# Patient Record
Sex: MALE | Race: WHITE | NOT HISPANIC OR LATINO | Employment: OTHER | URBAN - METROPOLITAN AREA
[De-identification: names, ages, dates, MRNs, and addresses within clinical notes are randomized per-mention and may not be internally consistent; named-entity substitution may affect disease eponyms.]

---

## 2017-10-15 ENCOUNTER — APPOINTMENT (EMERGENCY)
Dept: RADIOLOGY | Facility: HOSPITAL | Age: 67
End: 2017-10-15
Payer: MEDICARE

## 2017-10-15 ENCOUNTER — HOSPITAL ENCOUNTER (OUTPATIENT)
Facility: HOSPITAL | Age: 67
Setting detail: OBSERVATION
Discharge: HOME | End: 2017-10-16
Attending: FAMILY MEDICINE | Admitting: FAMILY MEDICINE
Payer: MEDICARE

## 2017-10-15 DIAGNOSIS — R41.3 AMNESIA: ICD-10-CM

## 2017-10-15 DIAGNOSIS — R41.82 ALTERED MENTAL STATUS, UNSPECIFIED ALTERED MENTAL STATUS TYPE: ICD-10-CM

## 2017-10-15 DIAGNOSIS — R41.82 ALTERED MENTAL STATUS: Primary | ICD-10-CM

## 2017-10-15 PROBLEM — D72.829 LEUKOCYTOSIS: Status: ACTIVE | Noted: 2017-10-15

## 2017-10-15 LAB
ALBUMIN SERPL BCP-MCNC: 3.9 G/DL (ref 3.5–5)
ALP SERPL-CCNC: 94 U/L (ref 46–116)
ALT SERPL W P-5'-P-CCNC: 38 U/L (ref 12–78)
AMMONIA PLAS-SCNC: <10 UMOL/L (ref 11–35)
AMPHETAMINES SERPL QL SCN: NEGATIVE
ANION GAP SERPL CALCULATED.3IONS-SCNC: 7 MMOL/L (ref 4–13)
APTT PPP: 24 SECONDS (ref 24–33)
AST SERPL W P-5'-P-CCNC: 40 U/L (ref 5–45)
BACTERIA UR QL AUTO: ABNORMAL /HPF
BARBITURATES UR QL: NEGATIVE
BASOPHILS # BLD AUTO: 0 THOUSANDS/ΜL (ref 0–0.1)
BASOPHILS NFR BLD AUTO: 0 % (ref 0–1)
BENZODIAZ UR QL: NEGATIVE
BILIRUB SERPL-MCNC: 0.8 MG/DL (ref 0.2–1)
BILIRUB UR QL STRIP: NEGATIVE
BUN SERPL-MCNC: 16 MG/DL (ref 5–25)
CALCIUM SERPL-MCNC: 8.9 MG/DL (ref 8.3–10.1)
CHLORIDE SERPL-SCNC: 100 MMOL/L (ref 100–108)
CLARITY UR: CLEAR
CO2 SERPL-SCNC: 29 MMOL/L (ref 21–32)
COCAINE UR QL: NEGATIVE
COLOR UR: YELLOW
CREAT SERPL-MCNC: 0.99 MG/DL (ref 0.6–1.3)
EOSINOPHIL # BLD AUTO: 0 THOUSAND/ΜL (ref 0–0.61)
EOSINOPHIL NFR BLD AUTO: 0 % (ref 0–6)
ERYTHROCYTE [DISTWIDTH] IN BLOOD BY AUTOMATED COUNT: 13.8 % (ref 11.6–15.1)
ETHANOL SERPL-MCNC: <3 MG/DL (ref 0–3)
GFR SERPL CREATININE-BSD FRML MDRD: 79 ML/MIN/1.73SQ M
GLUCOSE SERPL-MCNC: 133 MG/DL (ref 65–140)
GLUCOSE SERPL-MCNC: 153 MG/DL (ref 65–140)
GLUCOSE UR STRIP-MCNC: NEGATIVE MG/DL
HCT VFR BLD AUTO: 41.5 % (ref 42–52)
HGB BLD-MCNC: 13.7 G/DL (ref 14–18)
HGB UR QL STRIP.AUTO: ABNORMAL
HOLD SPECIMEN: NORMAL
INR PPP: 0.99 (ref 0.86–1.16)
KETONES UR STRIP-MCNC: ABNORMAL MG/DL
LEUKOCYTE ESTERASE UR QL STRIP: NEGATIVE
LYMPHOCYTES # BLD AUTO: 0.3 THOUSANDS/ΜL (ref 0.6–4.47)
LYMPHOCYTES NFR BLD AUTO: 2 % (ref 14–44)
MCH RBC QN AUTO: 31.8 PG (ref 27–31)
MCHC RBC AUTO-ENTMCNC: 33 G/DL (ref 31.4–37.4)
MCV RBC AUTO: 96 FL (ref 82–98)
METHADONE UR QL: NEGATIVE
MONOCYTES # BLD AUTO: 0.3 THOUSAND/ΜL (ref 0.17–1.22)
MONOCYTES NFR BLD AUTO: 2 % (ref 4–12)
MUCOUS THREADS UR QL AUTO: ABNORMAL
NEUTROPHILS # BLD AUTO: 13.1 THOUSANDS/ΜL (ref 1.85–7.62)
NEUTS SEG NFR BLD AUTO: 96 % (ref 43–75)
NITRITE UR QL STRIP: NEGATIVE
NON-SQ EPI CELLS URNS QL MICRO: ABNORMAL /HPF
NRBC BLD AUTO-RTO: 0 /100 WBCS
OPIATES UR QL SCN: NEGATIVE
PCP UR QL: NEGATIVE
PH UR STRIP.AUTO: 6 [PH] (ref 5–9)
PLATELET # BLD AUTO: 249 THOUSANDS/UL (ref 130–400)
PLATELET BLD QL SMEAR: ADEQUATE
PMV BLD AUTO: 7.2 FL (ref 8.9–12.7)
POTASSIUM SERPL-SCNC: 3.9 MMOL/L (ref 3.5–5.3)
PROT SERPL-MCNC: 7.6 G/DL (ref 6.4–8.2)
PROT UR STRIP-MCNC: NEGATIVE MG/DL
PROTHROMBIN TIME: 10.4 SECONDS (ref 9.4–11.7)
RBC # BLD AUTO: 4.32 MILLION/UL (ref 4.7–6.1)
RBC #/AREA URNS AUTO: ABNORMAL /HPF
SODIUM SERPL-SCNC: 136 MMOL/L (ref 136–145)
SP GR UR STRIP.AUTO: 1.01 (ref 1–1.03)
THC UR QL: NEGATIVE
TROPONIN I SERPL-MCNC: <0.02 NG/ML
TROPONIN I SERPL-MCNC: <0.02 NG/ML
UROBILINOGEN UR QL STRIP.AUTO: 0.2 E.U./DL
WBC # BLD AUTO: 13.7 THOUSAND/UL (ref 4.8–10.8)
WBC #/AREA URNS AUTO: ABNORMAL /HPF

## 2017-10-15 PROCEDURE — 85610 PROTHROMBIN TIME: CPT | Performed by: PHYSICIAN ASSISTANT

## 2017-10-15 PROCEDURE — 82948 REAGENT STRIP/BLOOD GLUCOSE: CPT

## 2017-10-15 PROCEDURE — 85730 THROMBOPLASTIN TIME PARTIAL: CPT | Performed by: PHYSICIAN ASSISTANT

## 2017-10-15 PROCEDURE — 84484 ASSAY OF TROPONIN QUANT: CPT | Performed by: PHYSICIAN ASSISTANT

## 2017-10-15 PROCEDURE — 80053 COMPREHEN METABOLIC PANEL: CPT | Performed by: PHYSICIAN ASSISTANT

## 2017-10-15 PROCEDURE — 93005 ELECTROCARDIOGRAM TRACING: CPT | Performed by: PHYSICIAN ASSISTANT

## 2017-10-15 PROCEDURE — 80307 DRUG TEST PRSMV CHEM ANLYZR: CPT | Performed by: PHYSICIAN ASSISTANT

## 2017-10-15 PROCEDURE — 70450 CT HEAD/BRAIN W/O DYE: CPT

## 2017-10-15 PROCEDURE — 81001 URINALYSIS AUTO W/SCOPE: CPT | Performed by: PHYSICIAN ASSISTANT

## 2017-10-15 PROCEDURE — 87081 CULTURE SCREEN ONLY: CPT | Performed by: FAMILY MEDICINE

## 2017-10-15 PROCEDURE — 99285 EMERGENCY DEPT VISIT HI MDM: CPT

## 2017-10-15 PROCEDURE — 71020 HB CHEST X-RAY 2VW FRONTAL&LATL: CPT

## 2017-10-15 PROCEDURE — 82140 ASSAY OF AMMONIA: CPT | Performed by: PHYSICIAN ASSISTANT

## 2017-10-15 PROCEDURE — 85025 COMPLETE CBC W/AUTO DIFF WBC: CPT | Performed by: PHYSICIAN ASSISTANT

## 2017-10-15 PROCEDURE — 36415 COLL VENOUS BLD VENIPUNCTURE: CPT | Performed by: PHYSICIAN ASSISTANT

## 2017-10-15 PROCEDURE — 80320 DRUG SCREEN QUANTALCOHOLS: CPT | Performed by: PHYSICIAN ASSISTANT

## 2017-10-15 PROCEDURE — 84484 ASSAY OF TROPONIN QUANT: CPT | Performed by: FAMILY MEDICINE

## 2017-10-15 RX ORDER — ONDANSETRON 2 MG/ML
4 INJECTION INTRAMUSCULAR; INTRAVENOUS EVERY 6 HOURS PRN
Status: DISCONTINUED | OUTPATIENT
Start: 2017-10-15 | End: 2017-10-16 | Stop reason: HOSPADM

## 2017-10-15 RX ORDER — ATORVASTATIN CALCIUM 40 MG/1
40 TABLET, FILM COATED ORAL
Status: DISCONTINUED | OUTPATIENT
Start: 2017-10-15 | End: 2017-10-16 | Stop reason: HOSPADM

## 2017-10-15 RX ORDER — ACETAMINOPHEN 325 MG/1
650 TABLET ORAL EVERY 4 HOURS PRN
Status: DISCONTINUED | OUTPATIENT
Start: 2017-10-15 | End: 2017-10-16 | Stop reason: HOSPADM

## 2017-10-15 RX ORDER — ASPIRIN 325 MG
325 TABLET ORAL DAILY
Status: DISCONTINUED | OUTPATIENT
Start: 2017-10-15 | End: 2017-10-16 | Stop reason: HOSPADM

## 2017-10-15 RX ORDER — SODIUM CHLORIDE 9 MG/ML
75 INJECTION, SOLUTION INTRAVENOUS CONTINUOUS
Status: DISCONTINUED | OUTPATIENT
Start: 2017-10-15 | End: 2017-10-16 | Stop reason: HOSPADM

## 2017-10-15 RX ADMIN — SODIUM CHLORIDE 1000 ML: 0.9 INJECTION, SOLUTION INTRAVENOUS at 17:16

## 2017-10-15 RX ADMIN — SODIUM CHLORIDE 75 ML/HR: 0.9 INJECTION, SOLUTION INTRAVENOUS at 19:51

## 2017-10-15 RX ADMIN — ACETAMINOPHEN 650 MG: 325 TABLET, FILM COATED ORAL at 19:51

## 2017-10-15 RX ADMIN — ATORVASTATIN CALCIUM 40 MG: 40 TABLET, FILM COATED ORAL at 22:28

## 2017-10-15 RX ADMIN — ASPIRIN 325 MG: 325 TABLET ORAL at 19:51

## 2017-10-15 NOTE — ED PROVIDER NOTES
History  Chief Complaint   Patient presents with    Altered Mental Status     patient brought to ER by sister for altered mental status - last seen normal yesterday  patient c/o headache  History provided by:  Patient   used: No    Altered Mental Status   Presenting symptoms: behavior changes, confusion, disorientation and memory loss    Presenting symptoms: no combativeness, no lethargy, no partial responsiveness and no unresponsiveness    Severity:  Severe  Most recent episode:  Yesterday  Episode history:  Continuous  Duration:  1 day  Timing:  Constant  Progression:  Unchanged  Chronicity:  New  Context: not alcohol use, not dementia, not drug use, not head injury, not homeless, taking medications as prescribed, not nursing home resident, not recent change in medication, not recent illness and not recent infection    Associated symptoms: headaches and seizures (Probable seizure)    Associated symptoms: no abdominal pain, normal movement, no agitation, no bladder incontinence, no decreased appetite, no depression, no difficulty breathing, no eye deviation, no fever, no hallucinations, no light-headedness, no nausea, no palpitations, no rash, no slurred speech, no suicidal behavior, no visual change, no vomiting and no weakness        None       History reviewed  No pertinent past medical history  Past Surgical History:   Procedure Laterality Date    CHOLECYSTECTOMY      FRACTURE SURGERY      Left femur fx       History reviewed  No pertinent family history  I have reviewed and agree with the history as documented  Social History   Substance Use Topics    Smoking status: Never Smoker    Smokeless tobacco: Never Used    Alcohol use No        Review of Systems   Constitutional: Negative for chills, decreased appetite, diaphoresis, fatigue and fever  HENT: Negative  Eyes: Negative for photophobia and visual disturbance     Respiratory: Negative for cough, chest tightness, shortness of breath and wheezing  Cardiovascular: Negative for palpitations  Gastrointestinal: Negative for abdominal pain, nausea and vomiting  Genitourinary: Negative  Negative for bladder incontinence  Musculoskeletal: Positive for back pain (Right side)  Negative for myalgias and neck stiffness  Skin: Negative for color change, pallor and rash  Neurological: Positive for seizures (Probable seizure) and headaches  Negative for dizziness, syncope, facial asymmetry, weakness, light-headedness and numbness  Psychiatric/Behavioral: Positive for confusion and memory loss  Negative for agitation and hallucinations  Physical Exam  ED Triage Vitals   Temperature Pulse Respirations Blood Pressure SpO2   10/15/17 1533 10/15/17 1533 10/15/17 1533 10/15/17 1533 10/15/17 1533   98 4 °F (36 9 °C) 88 18 139/62 94 %      Temp Source Heart Rate Source Patient Position - Orthostatic VS BP Location FiO2 (%)   10/15/17 1533 10/15/17 1533 10/15/17 1533 10/15/17 1910 --   Oral Monitor Lying Right arm       Pain Score       10/15/17 1533       5           Physical Exam   Constitutional: He appears well-developed and well-nourished  No distress  HENT:   Head: Normocephalic and atraumatic  Right Ear: External ear normal    Left Ear: External ear normal    Nose: Nose normal    Small tongue abrasions on bilateral sides   Eyes: Pupils are equal, round, and reactive to light  Horizontal nystagmus on exam   Neck: Normal range of motion  Neck supple  No tracheal deviation present  Cardiovascular: Normal rate, regular rhythm, normal heart sounds and intact distal pulses  Exam reveals no friction rub  No murmur heard  Pulmonary/Chest: Effort normal and breath sounds normal  No respiratory distress  He has no wheezes  Abdominal: Soft  There is no tenderness  Musculoskeletal:        Back:    Lymphadenopathy:     He has no cervical adenopathy  Neurological: He is alert  He has normal strength   He is disoriented (Oriented to person and place only)  He displays no tremor  No cranial nerve deficit or sensory deficit  He exhibits normal muscle tone  Coordination normal  GCS eye subscore is 4  GCS verbal subscore is 5  GCS motor subscore is 6  No pronator drift  Strength +5 throughout  Skin: Skin is warm and dry  Capillary refill takes less than 2 seconds  No rash noted  He is not diaphoretic  No pallor  Nursing note and vitals reviewed  ED Medications  Medications   sodium chloride 0 9 % infusion (75 mL/hr Intravenous New Bag 10/1950)   acetaminophen (TYLENOL) tablet 650 mg (650 mg Oral Given 10/1950)   ondansetron (ZOFRAN) injection 4 mg (not administered)   atorvastatin (LIPITOR) tablet 40 mg (40 mg Oral Given 10/15/17 2228)   aspirin tablet 325 mg (325 mg Oral Given 10/1950)   enoxaparin (LOVENOX) subcutaneous injection 40 mg (not administered)   sodium chloride 0 9 % bolus 1,000 mL (1,000 mL Intravenous New Bag 10/15/17 1716)       Diagnostic Studies  Labs Reviewed   COMPREHENSIVE METABOLIC PANEL - Abnormal        Result Value Ref Range Status    Glucose 153 (*) 65 - 140 mg/dL Final    Comment:   If the patient is fasting, the ADA then defines impaired fasting glucose as > 100 mg/dL and diabetes as > or equal to 123 mg/dL  Specimen collection should occur prior to Sulfasalazine administration due to the potential for falsely depressed results  Specimen collection should occur prior to Sulfapyridine administration due to the potential for falsely elevated results      Sodium 136  136 - 145 mmol/L Final    Potassium 3 9  3 5 - 5 3 mmol/L Final    Chloride 100  100 - 108 mmol/L Final    CO2 29  21 - 32 mmol/L Final    Anion Gap 7  4 - 13 mmol/L Final    BUN 16  5 - 25 mg/dL Final    Creatinine 0 99  0 60 - 1 30 mg/dL Final    Comment: Standardized to IDMS reference method    Calcium 8 9  8 3 - 10 1 mg/dL Final    AST 40  5 - 45 U/L Final    Comment:   Specimen collection should occur prior to Sulfasalazine administration due to the potential for falsely depressed results  ALT 38  12 - 78 U/L Final    Comment:   Specimen collection should occur prior to Sulfasalazine administration due to the potential for falsely depressed results  Alkaline Phosphatase 94  46 - 116 U/L Final    Total Protein 7 6  6 4 - 8 2 g/dL Final    Albumin 3 9  3 5 - 5 0 g/dL Final    Total Bilirubin 0 80  0 20 - 1 00 mg/dL Final    eGFR 79  ml/min/1 73sq m Final    Narrative:     National Kidney Disease Education Program recommendations are as follows:  GFR calculation is accurate only with a steady state creatinine  Chronic Kidney disease less than 60 ml/min/1 73 sq  meters  Kidney failure less than 15 ml/min/1 73 sq  meters     CBC AND DIFFERENTIAL - Abnormal     WBC 13 70 (*) 4 80 - 10 80 Thousand/uL Final    RBC 4 32 (*) 4 70 - 6 10 Million/uL Final    Hemoglobin 13 7 (*) 14 0 - 18 0 g/dL Final    Hematocrit 41 5 (*) 42 0 - 52 0 % Final    MCH 31 8 (*) 27 0 - 31 0 pg Final    MPV 7 2 (*) 8 9 - 12 7 fL Final    Neutrophils Relative 96 (*) 43 - 75 % Final    Lymphocytes Relative 2 (*) 14 - 44 % Final    Monocytes Relative 2 (*) 4 - 12 % Final    Neutrophils Absolute 13 10 (*) 1 85 - 7 62 Thousands/µL Final    Lymphocytes Absolute 0 30 (*) 0 60 - 4 47 Thousands/µL Final    MCV 96  82 - 98 fL Final    MCHC 33 0  31 4 - 37 4 g/dL Final    RDW 13 8  11 6 - 15 1 % Final    Platelets 222  970 - 400 Thousands/uL Final    nRBC 0  /100 WBCs Final    Eosinophils Relative 0  0 - 6 % Final    Basophils Relative 0  0 - 1 % Final    Monocytes Absolute 0 30  0 17 - 1 22 Thousand/µL Final    Eosinophils Absolute 0 00  0 00 - 0 61 Thousand/µL Final    Basophils Absolute 0 00  0 00 - 0 10 Thousands/µL Final   UA W REFLEX TO MICROSCOPIC WITH REFLEX TO CULTURE - Abnormal     Ketones, UA Trace (*) Negative mg/dl Final    Blood, UA Trace-lysed (*) Negative Final    Color, UA Yellow   Final    Clarity, UA Clear   Final    Specific [place], UA 1 015  1 000 - 1 030 Final    pH, UA 6 0  5 0 - 9 0 Final    Leukocytes, UA Negative  Negative Final    Nitrite, UA Negative  Negative Final    Protein, UA Negative  Negative mg/dl Final    Glucose, UA Negative  Negative mg/dl Final    Urobilinogen, UA 0 2  0 2, 1 0 E U /dl E U /dl Final    Bilirubin, UA Negative  Negative Final   AMMONIA - Abnormal     Ammonia <10 (*) 11 - 35 umol/L Final    Comment: 3  Specimen collection should occur prior to Sulfapyridine administration due to the potential for falsely depressed results  URINE MICROSCOPIC - Abnormal     RBC, UA 2-4 (*) None Seen, 0-5 /hpf Final    WBC, UA 2-4 (*) None Seen, 0-5, 5-55, 5-65 /hpf Final    Epithelial Cells Occasional  None Seen, Occasional /hpf Final    Bacteria, UA Occasional  None Seen, Occasional /hpf Final    MUCOUS THREADS Moderate  Occasional, Moderate, Innumerable Final   TROPONIN I - Normal    Troponin I <0 02  <=0 04 ng/mL Final    Comment: 3Autovalidation override    Narrative:     [employer] Chemistry analyzer 99% cutoff is > 0 04 ng/mL in network labs    o cTnI 99% cutoff is useful only when applied to patients in the clinical setting of myocardial ischemia  o cTnI 99% cutoff should be interpreted in the context of clinical history, ECG findings and possibly cardiac imaging to establish correct diagnosis  o cTnI 99% cutoff may be suggestive but clearly not indicative of a coronary event without the clinical setting of myocardial ischemia  PROTIME-INR - Normal    Protime 10 4  9 4 - 11 7 seconds Final    INR 0 99  0 86 - 1 16 Final   APTT - Normal    PTT 24  24 - 33 seconds Final    Narrative:      Therapeutic Heparin Range = 60-90 seconds   MEDICAL ALCOHOL - Normal    Ethanol Lvl <3  0 - 3 mg/dL Final   RAPID DRUG SCREEN, URINE - Normal    Amph/Meth UR Negative  Negative Final    Barbiturate Ur Negative  Negative Final    Benzodiazepine Urine Negative  Negative Final    Cocaine Urine Negative  Negative Final    Methadone Urine Negative  Negative Final    Opiate Urine Negative  Negative Final    PCP Ur Negative  Negative Final    THC Urine Negative  Negative Final    Narrative:     FOR MEDICAL PURPOSES ONLY  IF CONFIRMATION NEEDED PLEASE CONTACT THE LAB WITHIN 5 DAYS  Drug Screen Cutoff Levels:  AMPHETAMINE/METHAMPHETAMINES  1000 ng/mL  BARBITURATES     200 ng/mL  BENZODIAZEPINES     200 ng/mL  COCAINE      300 ng/mL  METHADONE      300 ng/mL  OPIATES      300 ng/mL  PHENCYCLIDINE     25 ng/mL  THC       50 ng/mL   POCT GLUCOSE - Normal    POC Glucose 133  65 - 140 mg/dl Final    Comment: RN NotifiedThe result was performed at [facility] [address]: [address], [address] REVIEW(PHLEBS DO NOT ORDER) - Normal    Platelet Estimate Adequate  Adequate Final   LAVENDER TOP 3 ML ON HOLD    Extra Tube Hold for add-ons  Final    Comment: Auto resulted  RED TOP       CT head without contrast   Final Result      No acute intracranial abnormality           Workstation performed: [id number]         XR chest 2 views    (Results Pending)   CTA head and neck w wo contrast    (Results Pending)   MRI Inpatient Order    (Results Pending)       Procedures  ECG 12 Lead Documentation  Date/Time: 10/15/2017 4:30 PM  Performed by: Jo Ann Moulton by: Robinson Amaya     Indications / Diagnosis:  Altered mental status  ECG reviewed by me, the ED Provider: yes    Patient location:  ED  Interpretation:     Interpretation: normal    Rate:     ECG rate:  85 bpm    ECG rate assessment: normal    Rhythm:     Rhythm: sinus rhythm    Ectopy:     Ectopy: none    QRS:     QRS axis:  Normal    QRS intervals:  Normal  Conduction:     Conduction: normal    ST segments:     ST segments:  Normal  T waves:     T waves: normal      CriticalCare Time  Performed by: Robinson Amaya  Authorized by: BERYL Alvarenga     Critical care provider statement:     Critical care time (minutes):  45    Critical care was necessary to treat or prevent imminent or life-threatening deterioration of the following conditions:  CNS failure or compromise    Critical care was time spent personally by me on the following activities:  Blood draw for specimens, obtaining history from patient or surrogate, development of treatment plan with patient or surrogate, evaluation of patient's response to treatment, examination of patient, ordering and performing treatments and interventions, ordering and review of laboratory studies, ordering and review of radiographic studies, re-evaluation of patient's condition and review of old charts    I assumed direction of critical care for this patient from another provider in my specialty: no            Phone Contacts  ED Phone Contact    ED Course  ED Course              NIH Stroke Scale    Flowsheet Row Most Recent Value   Level of Consciousness (1a )  0 Filed at: 10/15/2017 2000   LOC Questions (1b )  1 Filed at: 10/15/2017 1613   LOC Commands (1c )  0 Filed at: 10/15/2017 1613   Best Gaze (2 )  0 Filed at: 10/15/2017 1613   Visual (3 )  0 Filed at: 10/15/2017 1613   Facial Palsy (4 )  0 Filed at: 10/15/2017 1613   Motor Arm, Left (5a )  0 Filed at: 10/15/2017 1613   Motor Arm, Right (5b )  0 Filed at: 10/15/2017 1613   Motor Leg, Left (6a )  0 Filed at: 10/15/2017 1613   Motor Leg, Right (6b )  0 Filed at: 10/15/2017 1613   Limb Ataxia (7 )  0 Filed at: 10/15/2017 1613   Sensory (8 )  0 Filed at: 10/15/2017 1613   Best Language (9 )  0 Filed at: 10/15/2017 1613   Dysarthria (10 )  0 Filed at: 10/15/2017 1613   Extinction and Inattention (11 ) (Formerly Neglect)  0 Filed at: 10/15/2017 1613   Total  1 Filed at: 10/15/2017 1613                        MDM  Number of Diagnoses or Management Options  Altered mental status: new and requires workup  Diagnosis management comments: The patient's lab results were unremarkable, and CT head was negative for acute intracranial abnormality  Normal EKG and neg troponin    The patient's case was discussed with Dr Stew Esquivel who accepted the patient to her service for further inpatient evaluation, treatment, and specialist consult as necessary  Amount and/or Complexity of Data Reviewed  Clinical lab tests: ordered and reviewed  Tests in the radiology section of CPT®: ordered and reviewed  Obtain history from someone other than the patient: yes  Review and summarize past medical records: yes  Discuss the patient with other providers: yes (Dr Benny Ayala)      The patient presented with a condition in which there was a high probability of imminent or life-threatening deterioration, and critical care services (excluding separately billable procedures) totalled 30-74 minutes  Disposition  Final diagnoses: Altered mental status     ED Disposition     ED Disposition Condition Comment    Admit  Case was discussed with Dr Stew Esquivel and the patient's admission status was agreed to be Admission Status: observation status to the service of Dr Stew Esquivel  Follow-up Information    None       There are no discharge medications for this patient  No discharge procedures on file      ED Provider  Electronically Signed by  The     Mita Aquino PA-C  10/16/17 3934

## 2017-10-15 NOTE — H&P
History and Physical - [facility] Medicine    Patient Information: Gilda Guzman 78 y o  male MRN: [id number]  Unit/Bed#: ED CT1 Encounter: [id number]  Admitting Physician: Milton Jimenez DO  PCP: No primary care provider on file  Date of Admission:  10/15/17        Hospital Problem List:     Principal Problem:    Amnesia  Active Problems:    Leukocytosis      Assessment/Plan:    1  Amnesia - differential includes seizure, CVA, transient global amnesia  Patient with tongue abrasions and urinary incontinence  Will obtain EEG to rule out seizures  Consult Neurology for further recommendations  CT head showed no acute CVA  Check MRI of the brain, CTA head/neck, echo with bubble study for further evaluation  PT/OT while here  Patient started on aspirin, Lipitor  Check lipid panel, hemoglobin A1c level in the a m  Check neurochecks  Urine tox was negative  Alcohol level less than 3  Ammonia level less than 10    2  Leukocytosis - likely reactive in nature  No signs of infection  U/A not convincing for UTI  CXR with no obvious pneumonia although official reading is pending  Repeat level in the AM      VTE Prophylaxis: Enoxaparin (Lovenox)  / sequential compression device   Code Status: full code    Anticipated Length of Stay:  Patient will be admitted on an Observation basis with an anticipated length of stay of 1 midnights  Total Time for Visit, including Counseling / Coordination of Care: 45 minutes  Greater than 50% of this total time spent on direct patient counseling and coordination of care  Chief Complaint:     Altered Mental Status (patient brought to ER by sister for altered mental status - last seen normal yesterday  patient c/o headache   )      History of Present Illness:    Gilda Guzman is a 78 y o  male who presents with amnesia  Last known normal as per sister was yesterday morning by her other brother    Her other brother went to see the patient in the early afternoon today and found patient to be very forgetful and then went to his sister who brought the patient into the ER   patient also reported that he had wet his bed today  no bowel incontinence  Patient bought a  yesterday but does not remember this  He denies any visual changes, weakness, fevers, chills  As per sister there was no slurred speech or facial droop noted when she had seen her brother  patient went to his sister's house on Thursday which he somewhat remembers but he is not sure why he went or what they talked about  Patient denies any prior episode of this  He denies any new medications or use of illicit drugs or prior history of seizures  Patient has not seen a doctor in [address]  When asked if he remembered what he did over this past week, patient stated that he could not remember much at all  He reports a headache right now but unsure when it started    Review of Systems:    Review of Systems   Constitutional: Negative for chills and fever  HENT: Negative for congestion, sore throat and trouble swallowing  Eyes: Negative for photophobia and visual disturbance  Respiratory: Negative for chest tightness and shortness of breath  Cardiovascular: Negative for chest pain and leg swelling  Gastrointestinal: Negative for abdominal pain, diarrhea, nausea and vomiting  Genitourinary: Negative for dysuria, frequency and hematuria  Musculoskeletal: Negative for neck stiffness  Skin: Negative for wound  Neurological: Positive for headaches  Negative for dizziness, syncope, speech difficulty, weakness and light-headedness  Hematological: Does not bruise/bleed easily  Psychiatric/Behavioral: Positive for confusion  Negative for agitation  Past Medical and Surgical History:     History reviewed  No pertinent past medical history      Past Surgical History:   Procedure Laterality Date    CHOLECYSTECTOMY         Meds/Allergies:    PTA meds:   None       Allergies: No Known Allergies    Social History:     Marital Status: Single     Substance Use History:   History   Alcohol Use No     History   Smoking Status    Never Smoker   Smokeless Tobacco    Never Used     History   Drug Use No       Family History:    History reviewed  No pertinent family history  Physical Exam:     Vitals:   Blood Pressure: 122/64 (10/15/17 1645)  Pulse: 84 (10/15/17 1800)  Temperature: 98 4 °F (36 9 °C) (10/15/17 1533)  Temp Source: Oral (10/15/17 1533)  Respirations: 21 (10/15/17 1800)  Height: 6' (182 9 cm) (10/15/17 1533)  SpO2: 97 % (10/15/17 1800)    Physical Exam   Constitutional: He appears well-developed and well-nourished  No distress  HENT:   Head: Normocephalic and atraumatic  B/L tongue abrasions noted   Eyes: EOM are normal  Right eye exhibits no discharge  Left eye exhibits no discharge  No scleral icterus  Neck: Neck supple  No tracheal deviation present  Cardiovascular: Normal rate and regular rhythm  Pulmonary/Chest: Effort normal and breath sounds normal  No respiratory distress  He has no wheezes  He has no rales  Abdominal: Soft  Bowel sounds are normal  He exhibits no distension  There is no tenderness  Musculoskeletal: He exhibits no edema  Neurological: He is alert  No cranial nerve deficit  He exhibits normal muscle tone  Oriented to self and place only  Knew his birthday  Patient was informed what the month and year is and upon questioning again was able to recall the year only  No pronator drift noted   Skin: Skin is dry  He is not diaphoretic  Psychiatric: He has a normal mood and affect  Lab Results: I have personally reviewed pertinent reports          Results from last 7 days  Lab Units 10/15/17  1545   WBC Thousand/uL 13 70*   HEMOGLOBIN g/dL 13 7*   HEMATOCRIT % 41 5*   PLATELETS Thousands/uL 249   NEUTROS PCT % 96*   LYMPHS PCT % 2*   MONOS PCT % 2*   EOS PCT % 0       Results from last 7 days  Lab Units 10/15/17  1545   SODIUM mmol/L 136 POTASSIUM mmol/L 3 9   CHLORIDE mmol/L 100   CO2 mmol/L 29   BUN mg/dL 16   CREATININE mg/dL 0 99   CALCIUM mg/dL 8 9   TOTAL PROTEIN g/dL 7 6   BILIRUBIN TOTAL mg/dL 0 80   ALK PHOS U/L 94   ALT U/L 38   AST U/L 40   GLUCOSE RANDOM mg/dL 153*       Results from last 7 days  Lab Units 10/15/17  1545   INR  0 99       Imaging: I have personally reviewed pertinent reports  Ct Head Without Contrast    Result Date: 10/15/2017  Narrative: CT BRAIN - WITHOUT CONTRAST INDICATION:  Altered mental status COMPARISON:  None  TECHNIQUE:  CT examination of the brain was performed  In addition to axial images, coronal reformatted images were created and submitted for interpretation  Radiation dose length product (DLP) for this visit:  1185 38 mGy-cm   This examination, like all CT scans performed in the [facility], was performed utilizing techniques to minimize radiation dose exposure, including the use of iterative reconstruction and automated exposure control  IMAGE QUALITY:  Diagnostic  FINDINGS:  PARENCHYMA:  No intracranial mass, mass effect or midline shift  No CT signs of acute infarction  There is no parenchymal hemorrhage  VENTRICLES AND EXTRA-AXIAL SPACES:  Normal for patient's age  VISUALIZED ORBITS AND PARANASAL SINUSES:  Orbits appear normal   Mild scattered sinus mucosal thickening is noted  No fluid levels are seen  CALVARIUM AND EXTRACRANIAL SOFT TISSUES:   Normal      Impression: No acute intracranial abnormality  Workstation performed: [id number]       CT head without contrast   Final Result      No acute intracranial abnormality           Workstation performed: [id number]         XR chest 2 views    (Results Pending)   CTA head and neck w wo contrast    (Results Pending)       EKG, Pathology, and Other Studies Reviewed on Admission:   · EKG shows sinus rhythm with no ST elevations    [employer] Records Reviewed: N/A    ** Please Note: Dragon 360 Dictation voice to text software may have been used in the creation of this document   **

## 2017-10-16 ENCOUNTER — APPOINTMENT (OUTPATIENT)
Dept: NEUROLOGY | Facility: HOSPITAL | Age: 67
End: 2017-10-16
Attending: PSYCHIATRY & NEUROLOGY
Payer: MEDICARE

## 2017-10-16 ENCOUNTER — APPOINTMENT (OUTPATIENT)
Dept: RADIOLOGY | Facility: HOSPITAL | Age: 67
End: 2017-10-16
Payer: MEDICARE

## 2017-10-16 ENCOUNTER — APPOINTMENT (OUTPATIENT)
Dept: NON INVASIVE DIAGNOSTICS | Facility: HOSPITAL | Age: 67
End: 2017-10-16
Payer: MEDICARE

## 2017-10-16 VITALS
OXYGEN SATURATION: 95 % | RESPIRATION RATE: 18 BRPM | DIASTOLIC BLOOD PRESSURE: 65 MMHG | TEMPERATURE: 99.1 F | SYSTOLIC BLOOD PRESSURE: 139 MMHG | HEIGHT: 72 IN | BODY MASS INDEX: 24.79 KG/M2 | WEIGHT: 183 LBS | HEART RATE: 80 BPM

## 2017-10-16 PROBLEM — D72.829 LEUKOCYTOSIS: Status: RESOLVED | Noted: 2017-10-15 | Resolved: 2017-10-16

## 2017-10-16 PROBLEM — R41.3 AMNESIA: Status: RESOLVED | Noted: 2017-10-15 | Resolved: 2017-10-16

## 2017-10-16 LAB
ANION GAP SERPL CALCULATED.3IONS-SCNC: 7 MMOL/L (ref 4–13)
BUN SERPL-MCNC: 14 MG/DL (ref 5–25)
CALCIUM SERPL-MCNC: 8.5 MG/DL (ref 8.3–10.1)
CHLORIDE SERPL-SCNC: 106 MMOL/L (ref 100–108)
CHOLEST SERPL-MCNC: 111 MG/DL (ref 50–200)
CO2 SERPL-SCNC: 28 MMOL/L (ref 21–32)
CREAT SERPL-MCNC: 0.83 MG/DL (ref 0.6–1.3)
ERYTHROCYTE [DISTWIDTH] IN BLOOD BY AUTOMATED COUNT: 14 % (ref 11.6–15.1)
EST. AVERAGE GLUCOSE BLD GHB EST-MCNC: 111 MG/DL
GFR SERPL CREATININE-BSD FRML MDRD: 92 ML/MIN/1.73SQ M
GLUCOSE SERPL-MCNC: 88 MG/DL (ref 65–140)
HBA1C MFR BLD: 5.5 % (ref 4.2–6.3)
HCT VFR BLD AUTO: 36.4 % (ref 42–52)
HDLC SERPL-MCNC: 49 MG/DL (ref 40–60)
HGB BLD-MCNC: 11.9 G/DL (ref 14–18)
LDLC SERPL CALC-MCNC: 55 MG/DL (ref 0–100)
MAGNESIUM SERPL-MCNC: 2.2 MG/DL (ref 1.6–2.6)
MCH RBC QN AUTO: 31.5 PG (ref 27–31)
MCHC RBC AUTO-ENTMCNC: 32.8 G/DL (ref 31.4–37.4)
MCV RBC AUTO: 96 FL (ref 82–98)
PHOSPHATE SERPL-MCNC: 2.7 MG/DL (ref 2.3–4.1)
PLATELET # BLD AUTO: 209 THOUSANDS/UL (ref 130–400)
PMV BLD AUTO: 7.3 FL (ref 8.9–12.7)
POTASSIUM SERPL-SCNC: 3.7 MMOL/L (ref 3.5–5.3)
RBC # BLD AUTO: 3.79 MILLION/UL (ref 4.7–6.1)
SODIUM SERPL-SCNC: 141 MMOL/L (ref 136–145)
TRIGL SERPL-MCNC: 35 MG/DL
WBC # BLD AUTO: 8.1 THOUSAND/UL (ref 4.8–10.8)

## 2017-10-16 PROCEDURE — 70498 CT ANGIOGRAPHY NECK: CPT

## 2017-10-16 PROCEDURE — G8989 SELF CARE D/C STATUS: HCPCS

## 2017-10-16 PROCEDURE — 80048 BASIC METABOLIC PNL TOTAL CA: CPT | Performed by: FAMILY MEDICINE

## 2017-10-16 PROCEDURE — 95816 EEG AWAKE AND DROWSY: CPT

## 2017-10-16 PROCEDURE — 93306 TTE W/DOPPLER COMPLETE: CPT

## 2017-10-16 PROCEDURE — 97162 PT EVAL MOD COMPLEX 30 MIN: CPT

## 2017-10-16 PROCEDURE — 92523 SPEECH SOUND LANG COMPREHEN: CPT

## 2017-10-16 PROCEDURE — G9169 MEMORY GOAL STATUS: HCPCS

## 2017-10-16 PROCEDURE — 97166 OT EVAL MOD COMPLEX 45 MIN: CPT

## 2017-10-16 PROCEDURE — 70551 MRI BRAIN STEM W/O DYE: CPT

## 2017-10-16 PROCEDURE — G8987 SELF CARE CURRENT STATUS: HCPCS

## 2017-10-16 PROCEDURE — 85027 COMPLETE CBC AUTOMATED: CPT | Performed by: FAMILY MEDICINE

## 2017-10-16 PROCEDURE — 70496 CT ANGIOGRAPHY HEAD: CPT

## 2017-10-16 PROCEDURE — G8978 MOBILITY CURRENT STATUS: HCPCS

## 2017-10-16 PROCEDURE — G9168 MEMORY CURRENT STATUS: HCPCS

## 2017-10-16 PROCEDURE — G8979 MOBILITY GOAL STATUS: HCPCS

## 2017-10-16 PROCEDURE — 84100 ASSAY OF PHOSPHORUS: CPT | Performed by: FAMILY MEDICINE

## 2017-10-16 PROCEDURE — G8988 SELF CARE GOAL STATUS: HCPCS

## 2017-10-16 PROCEDURE — 83735 ASSAY OF MAGNESIUM: CPT | Performed by: FAMILY MEDICINE

## 2017-10-16 PROCEDURE — 80061 LIPID PANEL: CPT | Performed by: FAMILY MEDICINE

## 2017-10-16 PROCEDURE — 83036 HEMOGLOBIN GLYCOSYLATED A1C: CPT | Performed by: FAMILY MEDICINE

## 2017-10-16 RX ORDER — LEVETIRACETAM 500 MG/1
500 TABLET ORAL EVERY 12 HOURS SCHEDULED
Qty: 60 TABLET | Refills: 0 | Status: SHIPPED | OUTPATIENT
Start: 2017-10-16 | End: 2018-03-08

## 2017-10-16 RX ADMIN — ENOXAPARIN SODIUM 40 MG: 40 INJECTION SUBCUTANEOUS at 09:40

## 2017-10-16 RX ADMIN — IOHEXOL 85 ML: 350 INJECTION, SOLUTION INTRAVENOUS at 01:29

## 2017-10-16 RX ADMIN — ASPIRIN 325 MG: 325 TABLET ORAL at 09:40

## 2017-10-16 NOTE — PLAN OF CARE
Problem: SLP ADULT - THOUGHT PROCESS, DISTURBED  Goal: Initial thought process eval performed  Outcome: Completed Date Met: 10/16/17

## 2017-10-16 NOTE — SOCIAL WORK
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

## 2017-10-16 NOTE — NUTRITION
10/16/17 1247   Assessment   Timepoint Initial  (Speech, Consult: ischemic stroke)   Labs   List Completed Labs (labs and meds reviewed)   Feeding Route   PO With assist  (Needs assist to order meals per ST)   Swallow (Per ST during discussion- pt needs cueing to answer some questions, order meals)   Adequacy of Intake   Nutrition Modality PO  (Cardiac Step 1)   Estimated calorie intake compared to estimated need Observed pt at breakfast meal, bites of oatmeal consumed, untouched entree at time of visit  Will monitor PO intakes     Nutrition Prognosis   Potential Fair   Nutrition Concerns (amnesia)   Comorbid Concerns CVA  (seizure)   Nutrition Precautions None   Nutrition Considerations (Education inappropriate d/t cognitive status)   PES Statement   Problem No nutrition diagnosis  (at this time)   Patient Nutrition Goals   Goal weight maintained;meet PO needs   Goal Status initiated   Timeframe to complete goal by next f/u   Recommendations/Interventions   Summary Will adjust room service for assistance to be provided with meal ordering   Interventions Diet: continued as ordered;Obtain current weight   Nutrition Recommendations Continue diet as ordered   Nutrition Complexity Risk   Nutrition complexity level Moderate risk   Nutrition review: 10/19/17  (po intakes)   Follow up date 10/23/17

## 2017-10-16 NOTE — CASE MANAGEMENT
Initial Clinical Review    Admission: Date/Time/Statement: N/A @ N/A     Orders Placed This Encounter   Procedures    Place in Observation     Standing Status:   Standing     Number of Occurrences:   1     Order Specific Question:   Admitting Physician     Answer:   Ariel Nichloson [[id number]]     Order Specific Question:   Level of Care     Answer:   Med Surg [16]         ED: Date/Time/Mode of Arrival:   ED Arrival Information     Expected Arrival Acuity Means of Arrival Escorted By Service Admission Type    - 10/15/2017 15:26 Emergent Walk-In Family Member General Medicine Emergency    Arrival Complaint    ALTERED MENTAL STATE          Chief Complaint:   Chief Complaint   Patient presents with    Altered Mental Status     patient brought to ER by sister for altered mental status - last seen normal yesterday  patient c/o headache  History of Illness: 78 y o  male who presents with amnesia  Last known normal as per sister was yesterday morning by her other brother  Her other brother went to see the patient in the early afternoon today and found patient to be very forgetful and then went to his sister who brought the patient into the ER   patient also reported that he had wet his bed today  no bowel incontinence  Patient bought a  yesterday but does not remember this  He denies any visual changes, weakness, fevers, chills  As per sister there was no slurred speech or facial droop noted when she had seen her brother  patient went to his sister's house on Thursday which he somewhat remembers but he is not sure why he went or what they talked about  Patient denies any prior episode of this  He denies any new medications or use of illicit drugs or prior history of seizures  Patient has not seen a doctor in [address]  When asked if he remembered what he did over this past week, patient stated that he could not remember much at all   He reports a headache right now but unsure when it started  B/L tongue abrasions noted   Oriented to self and place only  Knew his birthday  Patient was informed what the month and year is and upon questioning again was able to recall the year only  No pronator drift noted   ED Vital Signs:   ED Triage Vitals   Temperature Pulse Respirations Blood Pressure SpO2   10/15/17 1533 10/15/17 1533 10/15/17 1533 10/15/17 1533 10/15/17 1533   98 4 °F (36 9 °C) 88 18 139/62 94 %      Temp Source Heart Rate Source Patient Position - Orthostatic VS BP Location FiO2 (%)   10/15/17 1533 10/15/17 1533 10/15/17 1533 10/15/17 1910 --   Oral Monitor Lying Right arm       Pain Score       10/15/17 1533       5        Wt Readings from Last 1 Encounters:   No data found for Wt       Abnormal Labs/Diagnostic Test Results:   WBC Thousand/uL 13 70*   HEMOGLOBIN g/dL 13 7*   HEMATOCRIT % 41 5*     GLUCOSE RANDOM mg/dL 153*     CT head without contrast   Final Result   No acute intracranial abnormality  cxr nad  MRI  No acute intracranial pathology  No evidence of mass or ischemia  Punctate microhemorrhage within the left frontal lobe may be related to chronic hypertension  ED Treatment:   Medication Administration from 10/15/2017 1524 to 10/15/2017 1903       Date/Time Order Dose Route Action Action by Comments     10/15/2017 1716 sodium chloride 0 9 % bolus 1,000 mL 1,000 mL Intravenous New Bag Irina Herrera RN           Past Medical/Surgical History: Active Ambulatory Problems     Diagnosis Date Noted    No Active Ambulatory Problems     Resolved Ambulatory Problems     Diagnosis Date Noted    No Resolved Ambulatory Problems     No Additional Past Medical History       Admitting Diagnosis: Altered mental status [R41 82]  Amnesia [R41 3]    Age/Sex: 78 y o  male    Assessment/Plan:   Principal Problem:    Amnesia  Active Problems:    Leukocytosis  Assessment/Plan:  1  Amnesia - differential includes seizure, CVA, transient global amnesia    Patient with tongue abrasions and urinary incontinence  Will obtain EEG to rule out seizures  Consult Neurology for further recommendations  CT head showed no acute CVA  Check MRI of the brain, CTA head/neck, echo with bubble study for further evaluation  PT/OT while here  Patient started on aspirin, Lipitor  Check lipid panel, hemoglobin A1c level in the a m  Check neurochecks  Urine tox was negative  Alcohol level less than 3  Ammonia level less than 10  2  Leukocytosis - likely reactive in nature  No signs of infection  U/A not convincing for UTI  CXR with no obvious pneumonia although official reading is pending   Repeat level in the AM  Anticipated Length of Stay:  Patient will be admitted on an Observation basis with an anticipated length of stay of 1 midnights         Admission Orders:  TELE MON  CONSULT NEUROLOGY  NEURO CHECKS   PTOT SPEECH  MRI   EEG   ECHO  IS  Scheduled Meds:   aspirin 325 mg Oral Daily   atorvastatin 40 mg Oral HS   enoxaparin 40 mg Subcutaneous Daily     Continuous Infusions:   sodium chloride 75 mL/hr Last Rate: 75 mL/hr (10/1950)     PRN Meds:   acetaminophen    ondansetron

## 2017-10-16 NOTE — PLAN OF CARE
Problem: OCCUPATIONAL THERAPY ADULT  Goal: Performs self-care activities at highest level of function for planned discharge setting  See evaluation for individualized goals  Outcome: Adequate for Discharge  Limitation: Decreased cognition  Prognosis: Good  Assessment: Patient evaluated by Occupational Therapy  Patient admitted with Amnesia  The patients occupational profile, medical and therapy history includes a expanded review of medical and/or therapy records and additional review of physical, cognitive, or psychosocial history related to current functional performance  Comorbidities affecting functional mobility and ADLS include:  leukocytosis, recent incontinence, decreased orientation, decreased recall of recent events  Prior to admission, patient was independent with functional mobility without assistive device, independent with ADLS, independent with IADLS, living alone in single story home with 3 steps to enter and retired  The evaluation identifies the following performance deficits: decreased cognition, decreased orientation, decreased recall of recent events, that result in activity limitations and/or participation restrictions  This evaluation requires clinical decision making of moderate complexity, because the patient may present with comorbidities that affect occupational performance and required minimal or moderate modification of tasks or assistance with the consideration of several treatment options  The Barthel Index was used as a functional outcome tool presenting with a score of 100, indicating no limitations of functional mobility and ADLS  Pt currently undergoing medical workup for seizure vs stroke vs transient global amnesia  Pt demonstrates cognitive deficits related to orientation and memory will defer to care of nursing and Speech Therapy for skilled services  Recommend discharge to home with supervision    Recommendation: Speech consult  OT Discharge Recommendation:  (home with supervision from family )

## 2017-10-16 NOTE — NURSING NOTE
Patient discharged via walking accompanied by sister  Gait steady  IV DC and intact  Discharge instructions, care notes, and new Rx given  No further questions at this time  Non-smoker

## 2017-10-16 NOTE — CONSULTS
[facility] [employer]   Neurology Initial Consult    Viola Mandujano is a 78 y o  male  2 [place] 205/2 2011 [facility] obtained from:   Chief Complaint   Patient presents with    Altered Mental Status     patient brought to ER by sister for altered mental status - last seen normal yesterday  patient c/o headache  Assessment/Plan:    1  Most probable new onset seizure  2  Abnormal EEG    Patient's event could've been explained by post ictal confusion or TGA (transient global amnesia)  His EEG is however abnormal with left and right temporal region spikes during sleep suggestive of most likely seizure  Start on Keppra 500mg bid  His MRI brain is negative for any acute process or lesion  Discussed that we will need to report incident to [facility] and he shouldn't drive until further notice from them  He doesn't need ambulatory anymore because routine EEG is abnormal    Discussed plan with patient and primary team      HPI:    Viola Mandujano is a 77 yo M with no significant PMH presented with confusion  Patient's brother and sister went to see him yesterday late afternoon and he was not himself  He was very forgetful  They brought him for medical evaluation  During initial evaluation, patient couldn't remember what happened that day or the day prior  He lives alone  He didn't remember buying  the day prior  He couldn't recall going to his sister's house 2 days ago  He says he felt normal before going to sleep Sat night  He can't remember what happened after waking up  He says 'it's as if I never woke up'  He did wet his bed and there was mild tongue abrasion  Today he's starting to recollect things from day prior but still doesn't recall what happened yesterday morning until few hours later  He  Denies any seizure like activity in past  Denies focal weakness, paresthesia  He did report headache yesterday  He denies any recreational drug use  He doesn't drink alcohol or smoke   He did have leukocytosis upon arrival        History reviewed  No pertinent past medical history  Past Surgical History:   Procedure Laterality Date    CHOLECYSTECTOMY      FRACTURE SURGERY      Left femur fx       No Known Allergies      Current Facility-Administered Medications:     acetaminophen (TYLENOL) tablet 650 mg, 650 mg, Oral, Q4H PRN, Kelsy Revankar, DO, 650 mg at 10/1950    aspirin tablet 325 mg, 325 mg, Oral, Daily, Kelsy Revankar, DO, 325 mg at 10/16/17 0940    atorvastatin (LIPITOR) tablet 40 mg, 40 mg, Oral, HS, Kelsy Revankar, DO, 40 mg at 10/15/17 2228    enoxaparin (LOVENOX) subcutaneous injection 40 mg, 40 mg, Subcutaneous, Daily, Kelsy Revankar, DO, 40 mg at 10/16/17 0940    ondansetron (ZOFRAN) injection 4 mg, 4 mg, Intravenous, Q6H PRN, Kelsy Revankar, DO    sodium chloride 0 9 % infusion, 75 mL/hr, Intravenous, Continuous, Kelsy Revankar, DO, Last Rate: 75 mL/hr at 10/1950, 75 mL/hr at 10/1950    Social History     Social History    Marital status: Single     Spouse name: N/A    Number of children: N/A    Years of education: N/A     Occupational History    Not on file  Social History Main Topics    Smoking status: Never Smoker    Smokeless tobacco: Never Used    Alcohol use No    Drug use: No    Sexual activity: Not Currently     Other Topics Concern    Not on file     Social History Narrative    No narrative on file       History reviewed  No pertinent family history  Review of systems:  Please see HPI for positive symptoms  No fever, no chills, no weight change  Ocular: No drainage, no blurred vision  HEENT:  No sore throat, earache, or congestion  No neck pain  COR:  No chest pain  No palpitations  Lungs:  no sob, wheezing,  GI:  no  nausea, no vomiting, no diarrhea, no constipation, no anorexia  :  No dysuria, frequency, or urgency  No hematuria  Musculoskeletal:  No joint pain or swelling or edema  Skin:  No rash or itching   Psychiatric: no anxiety, no depression  Endocrine:  No polyuria or polydipsia  Physical examination:  Vitals:    10/16/17 1137   BP: 139/65   Pulse: 80   Resp: 18   Temp: 99 1 °F (37 3 °C)   SpO2: 95%       GENERAL APPEARANCE:  The patient is alert, oriented  He is in no acute distress  HEENT:  Head is normocephalic  The sinuses are otherwise nontender  Pupils are equal and reactive  NECK:  Supple without lymphadenopathy  HEART:  Regular rate and rhythm  LUNGS:  clear to auscultation  No crackles or wheezes are heard  ABDOMEN:  Soft, nontender, nondistended with good bowel sounds heard  EXTREMITIES:  Without cyanosis, clubbing or edema  Mental status: The patient is alert, attentive, and oriented  Speech is clear and fluent, good repetition, comprehension, and naming  he recalls 2/3 objects at 5 minutes  Patient is still having difficulty recalling any events from morning to late afternoon   Cranial nerves:  CN II: Visual fields are full to confrontation  Fundoscopic exam is normal with sharp discs and no vascular changes    Pupils are 3 mm and reactive to light  CN III, IV, VI: At primary gaze, there is no eye deviation  CN V: Facial sensation is intact to pinprick in all 3 divisions bilaterally  Corneal responses are intact  CN VII: Face is symmetric with normal eye closure and smile  CN VIII: Hearing is normal to rubbing fingers  CN IX, X: Palate elevates symmetrically  Phonation is normal   CN XI: Head turning and shoulder shrug are intact  CN XII: Tongue is midline with normal movements and no atrophy  Motor: There is no pronator drift of out-stretched arms    Muscle bulk and tone are normal      Muscle exam  Arm Right Left Leg Right Left   Deltoid 5/5 5/5 Iliopsoas 5/5 5/5   Biceps 5/5 5/5 Quads 5/5 5/5   Triceps 5/5 5/5 Hamstrings 5/5 5/5   Wrist Extension 5/5 5/5 Ankle Dorsi Flexion 5/5 5/5   Wrist Flexion 5/5 5/5 Ankle Plantar Flexion 5/5 5/5   Interossei 5/5 5/5 Ankle Eversion 5/5 5/5 APB 5/5 5/5 Ankle Inversion 5/5 5/5       Reflexes   RJ BJ TJ KJ AJ Plantars Coleman's   Right 2+ 2+ 2+ 2+ 2+ Downgoing Not present   Left 2+ 2+ 2+ 2+ 2+ Downgoing Not present     Sensory:  Light touch, pinprick, position sense, and vibration sense are intact in fingers and toes  Coordination:  Rapid alternating movements and fine finger movements are intact  There is no dysmetria on finger-to-nose and heel-knee-shin  There are no abnormal or extraneous movements  Romberg negative  Gait/Stance:  Normal heel/toe walking and tandem gait  Lab Results   Component Value Date    WBC 8 10 10/16/2017    HGB 11 9 (L) 10/16/2017    HCT 36 4 (L) 10/16/2017    MCV 96 10/16/2017     10/16/2017     Lab Results   Component Value Date    HGBA1C 5 5 10/16/2017     Lab Results   Component Value Date    ALT 38 10/15/2017    AST 40 10/15/2017    ALKPHOS 94 10/15/2017    BILITOT 0 80 10/15/2017     Lab Results   Component Value Date    GLUCOSE 88 10/16/2017    CALCIUM 8 5 10/16/2017     10/16/2017    K 3 7 10/16/2017    CO2 28 10/16/2017     10/16/2017    BUN 14 10/16/2017    CREATININE 0 83 10/16/2017         Radiology          Review of reports and Independent Interpretation of images or specimens:  Cta Head And Neck W Wo Contrast    Result Date: 10/16/2017  CT examination of the brain demonstrates no acute intracranial pathology  CT angiography and straight no stenosis or occlusive disease  No evidence of significant atherosclerotic disease  Workstation performed: [id number]     Ct Head Without Contrast    Result Date: 10/15/2017  No acute intracranial abnormality  Workstation performed: [id number]     Mri Brain Wo Contrast    Result Date: 10/16/2017  No acute intracranial pathology  No evidence of mass or ischemia  Punctate microhemorrhage within the left frontal lobe may be related to chronic hypertension  Workstation performed: [id number]                 Thank you for this consult      Total time of encounter: 60 min   More than 50% of time was spent in counseling and coordination of care of patient  SHOSHANA Mcknight [facility]  [facility] [address]

## 2017-10-16 NOTE — DISCHARGE SUMMARY
Discharge Summary - [facility]    Patient Information: Vannessa Rai 78 y o  male MRN: [id number]  Unit/Bed#: [facility] Encounter: [id number]    Discharging Physician / Practitioner: Chidi Martinez DO  PCP: No primary care provider on file  Admission Date: 10/15/2017  Discharge Date: 10/17/17    Reason for Admission: Altered Mental Status (patient brought to ER by sister for altered mental status - last seen normal yesterday  patient c/o headache   )      Discharge Diagnoses:     Principal Problem (Resolved):    Amnesia  Active Problems:    New onset seizure ([address] )  Resolved Problems:    Leukocytosis      Consultations During Hospital Stay:  [address] TO CASE MANAGEMENT  IP CONSULT TO NUTRITION SERVICES    Procedures Performed:     · EEG  · Echo    Significant Findings:     · See hospital course    Imaging while in hospital:    Cta Head And Neck W Wo Contrast    Result Date: 10/16/2017  Narrative: CTA NECK AND BRAIN WITH AND WITHOUT CONTRAST INDICATION: Mental status change  Confusion and amnesia  Encephalopathy  COMPARISON:   10/15/2017, CT  TECHNIQUE:  Routine CT imaging of the Brain without contrast   Post contrast imaging was performed after administration of iodinated contrast through the neck and brain  Post contrast axial 0 625 mm images timed to opacify the arterial system  3D rendering was performed on an independent workstation  MIP reconstructions performed  Coronal reconstructions were performed of the noncontrast portion of the brain  Radiation dose length product (DLP) for this visit:  1760 67 mGy-cm   This examination, like all CT scans performed in the [facility], was performed utilizing techniques to minimize radiation dose exposure, including the use of iterative reconstruction and automated exposure control     IV Contrast:  85 mL of iohexol (OMNIPAQUE)     IMAGE QUALITY:   Diagnostic FINDINGS: NONCONTRAST BRAIN PARENCHYMA:  No intracranial mass, mass effect or midline shift  No acute intracranial hemorrhage  No CT signs of acute infarction  VENTRICLES AND EXTRA-AXIAL SPACES:  Normal for patient's age  VISUALIZED ORBITS AND PARANASAL SINUSES:  Unremarkable  CALVARIUM AND EXTRACRANIAL SOFT TISSUES:   Normal  CERVICAL VASCULATURE AORTIC ARCH AND GREAT VESSELS:  Normal aortic arch and great vessel origins  Normal visualized subclavian vessels  RIGHT VERTEBRAL ARTERY CERVICAL SEGMENT:  Normal origin  The vessel is normal in caliber throughout the neck  LEFT VERTEBRAL ARTERY CERVICAL SEGMENT:  Normal origin  The vessel is normal in caliber throughout the neck  RIGHT EXTRACRANIAL CAROTID SEGMENT:  Normal caliber common carotid artery  Normal bifurcation and cervical internal carotid artery  No stenosis or dissection  LEFT EXTRACRANIAL CAROTID SEGMENT:  Normal caliber common carotid artery  Normal bifurcation and cervical internal carotid artery  No stenosis or dissection  NASCET criteria was used to determine the degree of internal carotid artery diameter stenosis  INTRACRANIAL VASCULATURE INTERNAL CAROTID ARTERIES:  Normal enhancement of the intracranial portions of the internal carotid arteries  Normal ophthalmic artery origins  Normal ICA terminus  ANTERIOR CIRCULATION:  Symmetric A1 segments and anterior cerebral arteries with normal enhancement  Normal anterior communicating artery  MIDDLE CEREBRAL ARTERY CIRCULATION:  M1 segment and middle cerebral artery branches demonstrate normal enhancement bilaterally  DISTAL VERTEBRAL ARTERIES:  Normal distal vertebral arteries  Posterior inferior cerebellar artery origins are normal  Normal vertebral basilar junction  BASILAR ARTERY:  Basilar artery is normal in caliber  Normal superior cerebellar arteries  POSTERIOR CEREBRAL ARTERIES: Both posterior cerebral arteries arises from the basilar tip  Both arteries demonstrate normal flow-related enhancement        DURAL VENOUS SINUSES:  Normal  NON VASCULAR ANATOMY BONY STRUCTURES:  No acute osseous abnormality  Minimal cervical spondylitic degenerative change  SOFT TISSUES OF THE NECK:  Normal         THORACIC INLET:  Unremarkable  Impression: CT examination of the brain demonstrates no acute intracranial pathology  CT angiography and straight no stenosis or occlusive disease  No evidence of significant atherosclerotic disease  Workstation performed: [id number]     Xr Chest 2 Views    Result Date: 10/16/2017  Narrative: CHEST INDICATION:  Mental status change  COMPARISON:  10/15/2017 VIEWS:  Frontal and lateral projections IMAGES:  2 FINDINGS:     Cardiomediastinal silhouette appears unremarkable  The lungs are clear  No pneumothorax or pleural effusion  Visualized osseous structures appear within normal limits for the patient's age  Impression: No active pulmonary disease  Workstation performed: [id number]     Ct Head Without Contrast    Result Date: 10/15/2017  Narrative: CT BRAIN - WITHOUT CONTRAST INDICATION:  Altered mental status COMPARISON:  None  TECHNIQUE:  CT examination of the brain was performed  In addition to axial images, coronal reformatted images were created and submitted for interpretation  Radiation dose length product (DLP) for this visit:  1185 38 mGy-cm   This examination, like all CT scans performed in the [facility], was performed utilizing techniques to minimize radiation dose exposure, including the use of iterative reconstruction and automated exposure control  IMAGE QUALITY:  Diagnostic  FINDINGS:  PARENCHYMA:  No intracranial mass, mass effect or midline shift  No CT signs of acute infarction  There is no parenchymal hemorrhage  VENTRICLES AND EXTRA-AXIAL SPACES:  Normal for patient's age  VISUALIZED ORBITS AND PARANASAL SINUSES:  Orbits appear normal   Mild scattered sinus mucosal thickening is noted  No fluid levels are seen   CALVARIUM AND EXTRACRANIAL SOFT TISSUES: Normal      Impression: No acute intracranial abnormality  Workstation performed: [id number]     Mri Brain Wo Contrast    Result Date: 10/16/2017  Narrative: MRI BRAIN WITHOUT CONTRAST INDICATION:  Amnesia  Mental status change  COMPARISON:   CT and CT angiography recently performed  TECHNIQUE:  Sagittal T1, axial T2, axial FLAIR, axial T1, axial [place] and axial diffusion imaging  IMAGE QUALITY:  Diagnostic  FINDINGS: BRAIN PARENCHYMA:  There is no discrete mass, mass effect or midline shift  No abnormal white matter signal identified  Brainstem and cerebellum demonstrate normal signal  Hemosiderin sensitive imaging demonstrates a punctate area of low signal within the left frontal lobe on series 7 image 86 possibly representing a tiny microhemorrhage  There is no acute intracranial hemorrhage  There is no evidence of acute infarction and diffusion imaging is unremarkable  VENTRICLES:  The ventricles are normal in size and contour  SELLA AND PITUITARY GLAND:  Normal  ORBITS:  Normal  PARANASAL SINUSES:  Small retention cyst within the inferior right maxillary sinus  Mild mucosal thickening of the ethmoid air cells  VASCULATURE:  Evaluation of the major intracranial vasculature demonstrates appropriate flow voids  CALVARIUM AND SKULL BASE:  Normal  EXTRACRANIAL SOFT TISSUES:  Normal      Impression: No acute intracranial pathology  No evidence of mass or ischemia  Punctate microhemorrhage within the left frontal lobe may be related to chronic hypertension  Workstation performed: [id number]       Incidental Findings:   · none    Test Results Pending at Discharge (will require follow up):   · As per After Visit Summary     Outpatient Tests Requested:  · none    Complications:  See hospital course    Hospital Course:     Josue Patterson is a 78 y o  male patient who originally presented to the hospital on 10/15/2017 due to amnesia    Patient was found to be very for acute from and could not on remember things such in his him going on moved her the day before  Patient also reported that he had when his bed but no bowel incontinence  Patient has not seen a doctor in [address]  He was admitted for this amnesia and differential included seizure, CVA, transient global amnesia  MRI was done which was negative for stroke  Echo with bubble study showed EF of 60% with no ASD or PFO  There was no right-to-left shunt  He was seen by Neurology  EEG was done and it was abnormal with left and right temporal region spikes most suggestive of seizure  He was started on Keppra 500 milligrams b i d  on discharge and will follow up with Neurology after  Patient was informed that this would be reported to [facility] and by law, he is not allowed to drive until Neurology clears him on follow-up  Number for [address] was given to patient as well for routine follow-up  Patient informed regarding the importance of following up with the neurologist and also PMD     Condition at Discharge: stable     Discharge Day Visit / Exam:     Subjective:  Mental status is much better today even as per sister  Denies any other complaints    Vitals: Blood Pressure: 139/65 (10/16/17 1137)  Pulse: 80 (10/16/17 1137)  Temperature: 99 1 °F (37 3 °C) (10/16/17 1137)  Temp Source: Oral (10/16/17 1137)  Respirations: 18 (10/16/17 1137)  Height: 6' (182 9 cm) (10/16/17 1137)  Weight - Scale: 83 kg (183 lb) (10/16/17 1137)  SpO2: 95 % (10/16/17 1137)  Exam:   Physical Exam   Constitutional: He is oriented to person, place, and time  He appears well-developed and well-nourished  No distress  HENT:   Head: Normocephalic and atraumatic  Mouth/Throat: Oropharynx is clear and moist    B/L tongue abrasions   Eyes: EOM are normal  Right eye exhibits no discharge  Left eye exhibits no discharge  No scleral icterus  Neck: Neck supple  No tracheal deviation present  Cardiovascular: Normal rate and regular rhythm      Pulmonary/Chest: Effort normal and breath sounds normal  No respiratory distress  He has no wheezes  He has no rales  Abdominal: Soft  Bowel sounds are normal  He exhibits no distension  There is no tenderness  Musculoskeletal: He exhibits no edema  Neurological: He is alert and oriented to person, place, and time  No cranial nerve deficit  Skin: Skin is dry  He is not diaphoretic  Psychiatric: He has a normal mood and affect  Discharge instructions/Information to patient and family:(Discharge Medications and Follow up):   See after visit summary for information provided to patient and family  Provisions for Follow-Up Care:  See after visit summary for information related to follow-up care and any pertinent home health orders  Disposition: Home    Planned Readmission:  No     Discharge Statement:  I spent 30 minutes discharging the patient  This time was spent on the day of discharge  I had direct contact with the patient on the day of discharge  Greater than 50% of the total time was spent examining patient, answering all patient questions, arranging and discussing plan of care with patient as well as directly providing post-discharge instructions  Additional time then spent on discharge activities  Discharge Medications:  See after visit summary for reconciled discharge medications provided to patient and family  ** Please Note: Dragon 360 Dictation voice to text software may have been used in the creation of this document   **

## 2017-10-16 NOTE — PLAN OF CARE
Problem: DISCHARGE PLANNING - CARE MANAGEMENT  Goal: Discharge to post-acute care or home with appropriate resources  INTERVENTIONS:  - Conduct assessment to determine patient/family and health care team treatment goals, and need for post-acute services based on payer coverage, community resources, and patient preferences, and barriers to discharge  - Address psychosocial, clinical, and financial barriers to discharge as identified in assessment in conjunction with the patient/family and health care team  - Arrange appropriate level of post-acute services according to patient's   needs and preference and payer coverage in collaboration with the physician and health care team  - Communicate with and update the patient/family, physician, and health care team regarding progress on the discharge plan  - Arrange appropriate transportation to post-acute venues   DCP IS TO HOME  Outcome: Progressing

## 2017-10-16 NOTE — SPEECH THERAPY NOTE
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  Cognition   Overall Cognitive Status Impaired   Arousal/ Participation Alert; Cooperative   Attention Within functional limits   Orientation Level Oriented to person and place; Disoriented to time and situation/ previous events  Memory Decreased long term memory and short term memory; Decreased recall of recent events   Following Commands Follows all commands and directions without difficulty   Pain Assessment   Pain Assessment No/ denies pain   Pain Score No Pain   Speech/Swallow Mechanism Exam   Labial Symmetry WFL   Labial Strength WFL   Labial ROM WFL   Facial Symmetry WFL   Facial Strength WFL   Lingual Strength WFL   Lingual ROM WFL   Velum WFL   Mandible WFL   Dentition Adequate   Volitional Cough Strong   Vocal Quality Clear   Volitional Swallow WFL   Respiratory Status Room air   Respirations 18   SpO2 95 %   Motor Speech Evaluation   Respiration/ Phonation WFL   Vocal Quality WFL   Dysarthria No   Intelligibility Intelligible   Apraxia None present   Auditory Comprehension   Word Level Comprehension WFL   Yes/ No Questions WFL   Comprehends Conversation Complex   Interfering Components Working memory   Expression   Primary Mode of Expression Verbal   Verbal Expression   Repetition No impairment   Automatic Speech WFL   Naming WFL   Sentence Level No Impairment   Pragmatics No elaboration of responses  Abnormal Affect Flat affect       Cognitive/Language and Holistic Reasoning   Problem Solving Reduced   Simple Functional Tasks Moderate   Complex Functional Tasks Moderate   Verbal Reasoning Skills Moderate   Other (Comment) Patient in denial of deficits     Safety/ Judgement Reduced   Insight Reduced Task Initiation Impaired   Planning Reduced   Organization Moderately disorganized   Processing Speed Delayed   Perseveration Not present   Summary   Speech/ Language Summary Speech and language skills are [facility]  Patient with short and long term memory/ cognitive difficulty  Cognition   Memory (FIM) 2 - Recognizes, recalls/performs 25-49%   Recommendations   Recommendations Inpatient speech therapy;  Home speech therapy  Patient requires supervision if discharged home due to cognitive deficits  Goals   Goal 1 Increase short term memory skills/ cognition for new information and learning  1 week  Goal 2 Functional cognitive abilities  1 week

## 2017-10-16 NOTE — PLAN OF CARE
DISCHARGE PLANNING     Discharge to home or other facility with appropriate resources Progressing        DISCHARGE PLANNING - CARE MANAGEMENT     Discharge to post-acute care or home with appropriate resources Progressing        INFECTION - ADULT     Absence or prevention of progression during hospitalization Progressing        Knowledge Deficit     Patient/family/caregiver demonstrates understanding of disease process, treatment plan, medications, and discharge instructions Progressing        NEUROSENSORY - ADULT     Achieves stable or improved neurological status Progressing        Nutrition/Hydration-ADULT     Nutrient/Hydration intake appropriate for improving, restoring or maintaining nutritional needs Progressing        PAIN - ADULT     Verbalizes/displays adequate comfort level or baseline comfort level Progressing        Potential for Falls     Patient will remain free of falls Progressing        SAFETY ADULT     Patient will remain free of falls Progressing

## 2017-10-16 NOTE — PLAN OF CARE
DISCHARGE PLANNING     Discharge to home or other facility with appropriate resources Progressing        DISCHARGE PLANNING - CARE MANAGEMENT     Discharge to post-acute care or home with appropriate resources Progressing        INFECTION - ADULT     Absence or prevention of progression during hospitalization Progressing        Knowledge Deficit     Patient/family/caregiver demonstrates understanding of disease process, treatment plan, medications, and discharge instructions Progressing        NEUROSENSORY - ADULT     Achieves stable or improved neurological status Progressing        PAIN - ADULT     Verbalizes/displays adequate comfort level or baseline comfort level Progressing        Potential for Falls     Patient will remain free of falls Progressing        SAFETY ADULT     Patient will remain free of falls Progressing

## 2017-10-16 NOTE — DISCHARGE INSTRUCTIONS
You are not allowed to drive by law  After follow up with neurology, if Dr Deana Sylvester says its ok then you may resume driving

## 2017-10-16 NOTE — CONSULTS
Consult received and appreciated  Unable to obtain full diet and wt hx d/t cognitive status  No wts to review in chart review  Will monitor po intakes  Recommend to obtain current wt      Thank you,   Reyna Delgado MA, RD, LDN  Pager: [phone]

## 2017-10-16 NOTE — PHYSICAL THERAPY NOTE
PT EVALUATION       10/16/17 0930   Pain Assessment   Pain Assessment No/denies pain   Home Living   Type of [address] One level  (3 BRODY)   Home Equipment (none)   Prior Function   Level of [place] Independent with ADLs and functional mobility   Lives With Alone   ADL Assistance Independent   IADLs Independent   Restrictions/Precautions   Other Precautions Cognitive   General   Additional Pertinent History pt admitted with amnesia, medical workup in progress for seizure vs stroke vs transient global anmesia   Cognition   Overall Cognitive Status Impaired   Arousal/Participation Cooperative   Orientation Level Oriented to person;Oriented to place;Oriented to situation   Memory Decreased recall of recent events   Following Commands Follows multistep commands without difficulty   Comments slow to respond, flat affect   RLE Assessment   RLE Assessment WNL   LLE Assessment   LLE Assessment WNL   Coordination   Movements are Fluid and Coordinated (no pronator drift, normal heel to shin)   Transfers   Sit to Stand 7  Independent   Stand to Sit 7  Independent   Stand pivot 7  Independent   Ambulation/Elevation   Gait pattern WNL   Gait Assistance 7  Independent   Assistive Device (none)   Distance 250 feet   Stair Management Assistance 7  Independent   Stair Management Technique One rail L   Number of Stairs 4   Balance   Static Sitting Good   Dynamic Sitting Good   Static Standing Good   Dynamic Standing Good   Assessment   Prognosis Good   Problem List Decreased cognition   Assessment Patient seen for Physical Therapy evaluation  Patient admitted with Amnesia  Comorbidities affecting patient's physical performance include: none  Personal factors affecting patient at time of initial evaluation include: stairs to enter home and decreased cognition  Prior to admission, patient was independent with functional mobility without assistive device and independent with ADLS    Please find objective findings from Physical Therapy assessment regarding body systems outlined above with impairments and limitations including none  The Barthel Index was used as a functional outcome tool presenting with a score of 100 today indicating no limitations of functional mobility and ADLS  Patient's clinical presentation is currently evolving as seen in patient's presentation of varying levels of cognitive performance  As demonstrated by objective findings, the assigned level of complexity for this evaluation is moderate      Goals   Patient Goals none stated   STG Expiration Date (NA)   LTG Expiration Date (NA)   Plan   Treatment/Interventions (pt to ambulate ad julio)   Recommendation   Recommendation (follow up with cognitive therapy if needed)   Barthel Index   Feeding 10   Bathing 5   Grooming Score 5   Dressing Score 10   Bladder Score 10   Bowels Score 10   Toilet Use Score 10   Transfers (Bed/Chair) Score 15   Mobility (Level Surface) Score 15   Stairs Score 10   Barthel Index Score 100

## 2017-10-16 NOTE — OCCUPATIONAL THERAPY NOTE
OT EVALUATION     10/16/17 0925   Restrictions/Precautions   Other Precautions Cognitive   Pain Assessment   Pain Assessment No/denies pain   Pain Score No Pain   Home Living   Type of [address] One [address];Stairs to enter with rails  (BRODY 3)   Home Equipment (pt denies any equipment)   Additional Comments pt very limited in history despite prompting, limited info available on chart   Prior Function   Level of [place] Independent with ADLs and functional mobility   Lives With Alone   ADL Assistance Independent   IADLs Independent   Falls in the last 6 months (denies)   Vocational Retired  (pt reports "I  work")   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   Subjective   Subjective I  work but [employer] retired  ADL   Where Assessed Edge of bed   UB Dressing Assistance 7  Independent   Bed Mobility   Rolling R 7  Independent   Supine to Sit 7  Independent   Transfers   Sit to Stand 7  Independent   Stand to Sit 7  Independent   Functional Mobility   Functional Mobility 7  Independent   Additional Comments functional distance on unit, practiced stairs with PT   Balance   Static Sitting Good   Dynamic Sitting Good   Static Standing Good   Dynamic Standing Good   Activity Tolerance   Activity Tolerance Patient tolerated treatment well   RUE Assessment   RUE Assessment (ROM: WFL MMT: 4+/5)   LUE Assessment   LUE Assessment (ROM: WFL MMT: 4+/5)   Cognition   Overall Cognitive Status Impaired   Arousal/Participation Alert; Cooperative   Orientation Level Oriented to person;Oriented to place; Disoriented to time   Memory Decreased recall of recent events;Decreased short term memory   Following Commands Follows multistep commands without difficulty   Comments slow to respond, limited in responses to questions   Assessment   Limitation Decreased cognition   Prognosis Good   Assessment Patient evaluated by Occupational Therapy  Patient admitted with Amnesia    The patients occupational profile, medical and therapy history includes a expanded review of medical and/or therapy records and additional review of physical, cognitive, or psychosocial history related to current functional performance  Comorbidities affecting functional mobility and ADLS include:  leukocytosis, recent incontinence, decreased orientation, decreased recall of recent events  Prior to admission, patient was independent with functional mobility without assistive device, independent with ADLS, independent with IADLS, living alone in single story home with 3 steps to enter and retired  The evaluation identifies the following performance deficits: decreased cognition, decreased orientation, decreased recall of recent events, that result in activity limitations and/or participation restrictions  This evaluation requires clinical decision making of moderate complexity, because the patient may present with comorbidities that affect occupational performance and required minimal or moderate modification of tasks or assistance with the consideration of several treatment options  The Barthel Index was used as a functional outcome tool presenting with a score of 100, indicating no limitations of functional mobility and ADLS  Pt currently undergoing medical workup for seizure vs stroke vs transient global amnesia  Pt demonstrates cognitive deficits related to orientation and memory will defer to care of nursing and Speech Therapy for skilled services  Recommend discharge to home with supervision     Goals   Patient Goals none stated   STG Time Frame (N/A)   LTG Time Frame (N/A)   Recommendation   Recommendation Speech consult   OT Discharge Recommendation (home with supervision from family )   Barthel Index   Feeding 10   Bathing 5   Grooming Score 5   Dressing Score 10   Bladder Score 10   Bowels Score 10   Toilet Use Score 10   Transfers (Bed/Chair) Score 15   Mobility (Level Surface) Score 15   Stairs Score 10   Barthel Index Score 100

## 2017-10-17 PROBLEM — R56.9 NEW ONSET SEIZURE (HCC): Status: ACTIVE | Noted: 2017-10-17

## 2017-10-17 LAB — MRSA NOSE QL CULT: NORMAL

## 2017-10-18 LAB
ATRIAL RATE: 85 BPM
P AXIS: 60 DEGREES
PR INTERVAL: 156 MS
QRS AXIS: 54 DEGREES
QRSD INTERVAL: 96 MS
QT INTERVAL: 392 MS
QTC INTERVAL: 466 MS
T WAVE AXIS: 17 DEGREES
VENTRICULAR RATE: 85 BPM

## 2017-11-15 ENCOUNTER — ALLSCRIPTS OFFICE VISIT (OUTPATIENT)
Dept: OTHER | Facility: OTHER | Age: 67
End: 2017-11-15

## 2017-11-16 NOTE — PROGRESS NOTES
Assessment  1  New onset seizure (780 39) (R56 9)    Plan  New onset seizure    · LevETIRAcetam 500 MG Oral Tablet; take 1 tablet by mouth twice a day   Rx By: Nancy Parham; Dispense: 30 Days ; #:60 Tablet; Refill: 4;New onset seizure; ALPHONSO = N; Verified Transmission to [employer]; Last Updated By: [employer]; 11/15/2017 1:33:13 PM    Discussion/Summary  Discussion Summary:   Mr Stephanie Robin has been seizure free since hospital admission  will resume on Keppra 500mg bid  refilled meds  long as he stays on medication, he has favorable prognosis  possible situations that can trigger a breakthrough seizure  avoiding using heavy machinery, taking bath, swimming if not supervised  Counseling Documentation With Imm: The patient was counseled regarding instructions for management,-- risk factor reductions,-- prognosis,-- patient and family education,-- impressions,-- risks and benefits of treatment options,-- importance of compliance with treatment  total time of encounter was 30 minutes-- and-- 20 minutes was spent counseling  Goals and Barriers: The patient has the current Goals: At his goal    Patient's Capacity to Self-Care: Patient is able to Self-Care  Medication SE Review and Pt Understands Tx: Possible side effects of new medications were reviewed with the patient/guardian today  The treatment plan was reviewed with the patient/guardian  The patient/guardian understands and agrees with the treatment plan   Self Referrals:   Self Referrals: No      Chief Complaint  Chief Complaint Free Text Note Form: seizure follow up      History of Present Illness  HPI: Mr Stephanie Robin is a 77 yo M with no significant PMH had presented to [facility] on Oct 16th with confusion  He woke up confused, very forgetful  He couldn't recall events from 2 days prior  His MRI brain was normal but his EEG had revealed right and left temporal lobe spikes and his event was thought to be a seizure   He was placed on Keppra 500mg bid and is taking it without any difficulty  He denies any seizure like activity since the hospital admission  does have two sisters with h/o seizure disorder  Review of Systems  Neurological ROS:  Constitutional: no fever, no chills, no recent weight gain, no recent weight loss, no complaints of feeling tired, no changes in appetite  Neurological General: no headache,-- no nausea or vomiting,-- no lightheadedness,-- no convulsions,-- no syncope-- and-- no trauma  Active Problems  1  New onset seizure (780 39) (R56 9)    Surgical History  1  History of Cholecystectomy    Family History  Mother    1  No pertinent family history  Father    2  No pertinent family history    Social History     · Denied: History of Alcohol use   · Denied: History of Drug use   · Never a smoker    Current Meds   1  LevETIRAcetam 500 MG Oral Tablet; Therapy: (Recorded:[id number]) to Recorded    Allergies    1  No Known Drug Allergies    Vitals  Signs   Recorded: [id number] 01:18PM   Heart Rate: 86  Systolic: 056, LUE  Diastolic: 81, LUE  Height: 6 ft   Weight: 185 lb   BMI Calculated: 25 09  BSA Calculated: 2 06  O2 Saturation: 98    Physical Exam   Constitutional  General appearance: No acute distress, well appearing and well nourished  Eyes  Ophthalmoscopic examination: Vision is grossly normal  Gross visual field testing by confrontation shows no abnormalities  EOMI in both eyes  Conjunctivae clear  Eyelids normal palpebral fissures equal  Orbits exhibit normal position  No discharge from the eyes  PERRL  Musculoskeletal  Gait and station: Normal gait, stance and balance  Muscle strength: Normal strength throughout  Muscle tone: No atrophy, abnormal movements, flaccidity, cogwheeling or spasticity  Neurologic  Orientation to person, place, and time: Normal    Recent and remote memory: Demonstrates normal memory  Attention span and concentration: Normal thought process and attention span     Language: Names objects, able to repeat phrases and speaks spontaneously  Fund of knowledge: Normal vocabulary with appropriate knowledge of current events and past history     2nd cranial nerve: Normal    3rd, 4th, and 6th cranial nerves: Normal    5th cranial nerve: Normal    7th cranial nerve: Normal    8th cranial nerve: Normal    9th cranial nerve: Normal    11th cranial nerve: Normal    12th cranial nerve: Normal    Sensation: Normal    Reflexes: Normal    Coordination: Normal        Signatures   Electronically signed by : SHOSHANA Martines ; Nov 15 2017  1:39PM EST                       (Author)

## 2018-01-12 VITALS
DIASTOLIC BLOOD PRESSURE: 81 MMHG | HEART RATE: 86 BPM | OXYGEN SATURATION: 98 % | WEIGHT: 185 LBS | BODY MASS INDEX: 25.06 KG/M2 | SYSTOLIC BLOOD PRESSURE: 131 MMHG | HEIGHT: 72 IN

## 2018-03-08 ENCOUNTER — OFFICE VISIT (OUTPATIENT)
Dept: NEUROLOGY | Facility: CLINIC | Age: 68
End: 2018-03-08
Payer: MEDICARE

## 2018-03-08 VITALS
SYSTOLIC BLOOD PRESSURE: 122 MMHG | HEART RATE: 87 BPM | BODY MASS INDEX: 25.19 KG/M2 | DIASTOLIC BLOOD PRESSURE: 74 MMHG | HEIGHT: 72 IN | WEIGHT: 186 LBS

## 2018-03-08 DIAGNOSIS — G40.909 SEIZURE DISORDER (HCC): Primary | ICD-10-CM

## 2018-03-08 PROCEDURE — 99214 OFFICE O/P EST MOD 30 MIN: CPT | Performed by: PSYCHIATRY & NEUROLOGY

## 2018-03-08 RX ORDER — LEVETIRACETAM 500 MG/1
1 TABLET ORAL 2 TIMES DAILY
COMMUNITY
Start: 2017-11-15 | End: 2018-07-10 | Stop reason: SDUPTHER

## 2018-03-08 NOTE — PROGRESS NOTES
Return NeuroOutpatient Note        Fransisco Oconnor  [id number]  86 y o   1950       Abnormal EEG and Seizures        History obtained from:  Patient     HPI/Subjective:    Mr Gabe Marley is a 77 yo M with h/o seizure returns as follow up  He had presented to [facility] on Oct 16th 2017 with confusion  He woke up confused, very forgetful  He couldn't recall events from 2 days prior  His MRI brain was normal but his EEG had revealed right and left temporal lobe spikes and his event was thought to be from  a seizure  He was placed on Keppra 500mg bid and is taking it without any difficulty  He denies any seizure like activity since the hospital admission  He's currently doing well  He's currently unemployed  He used to work on a farm  He has one sister with h/o epilepsy since childhood  Past Medical History:   Diagnosis Date    New onset seizure [facility])      Social History     Social History    Marital status: Single     Spouse name: N/A    Number of children: N/A    Years of education: N/A     Occupational History    Not on file  Social History Main Topics    Smoking status: Never Smoker    Smokeless tobacco: Never Used    Alcohol use No    Drug use: No    Sexual activity: Not Currently     Other Topics Concern    Not on file     Social History Narrative    No narrative on file     Family History   Problem Relation Age of Onset    Multiple sclerosis Sister     Seizures Sister      No Known Allergies  Current Outpatient Prescriptions on File Prior to Visit   Medication Sig Dispense Refill    [DISCONTINUED] levETIRAcetam (KEPPRA) 500 mg tablet Take 1 tablet by mouth every 12 (twelve) hours 60 tablet 0     No current facility-administered medications on file prior to visit  Review of Systems   Refer to positive review of systems in HPI     Constitutional- No fever  Eyes- No visual change  ENT- Hearing normal  CV- No chest pain  Resp- No Shortness of breath  GI- No diarrhea  - Bladder normal  MS- No Arthritis   Skin- No rash  Psych- No depression  Endo- No DM  Heme- No nodes    Vitals:    03/08/18 1451   BP: 122/74   BP Location: Left arm   Patient Position: Sitting   Pulse: 87   Weight: 84 4 kg (186 lb)   Height: 6' (1 829 m)       PHYSICAL EXAM:  Appearance: No Acute Distress  Ophthalmoscopic: Disc Flat, Normal fundus  Mental status:  Orientation: Awake, Alert, and Orientedx3  Memory: Registation 3/3 Recall 3/3  Attention: normal  Knowledge: good  Language: No aphasia  Speech: No dysarthria  Cranial Nerves:  2 No Visual Defect on Confrontation, Pupils round, equal, reactive to light  3,4,6 Extraocular Movements Intact, no nystagmus  5 Facial Sensation Intact  7 No facial asymmetry  8 Intact hearing  9,10 Palate symmetric, normal gag  11 Good shoulder shrug  12 Tongue Midline  Gait: Stable  Coordination: No ataxia with finger to nose testing, and heel to shin  Sensory: Intact, Symmetric to pinprick, light touch, vibration, and joint position  Muscle Tone: Normal              Muscle exam:  Arm Right Left Leg Right Left   Deltoid 5/5 5/5 Iliopsoas 5/5 5/5   Biceps 5/5 5/5 Quads 5/5 5/5   Triceps 5/5 5/5 Hamstrings 5/5 5/5   Wrist Extension 5/5 5/5 Ankle Dorsi Flexion 5/5 5/5   Wrist Flexion 5/5 5/5 Ankle Plantar Flexion 5/5 5/5   Interossei 5/5 5/5 Ankle Eversion 5/5 5/5   APB 5/5 5/5 Ankle Inversion 5/5 5/5       Reflexes   RJ BJ TJ KJ AJ Plantars Coleman's   Right 2+ 2+ 2+ 2+ 2+ Downgoing Not present   Left 2+ 2+ 2+ 2+ 2+ Downgoing Not present     Personal review of  Labs:                  Diagnoses and all orders for this visit:        1  Seizure disorder [facility])           Patient has remained seizure free since October 16th  He should be able to resume driving starting April 15th  Will resume Keppra 500mg bid  We discussed his tendency for seizures based on EEG finding and possible triggers to avoid  Referring him to Dr Sue Espinoza to establish care             Counseling Documentation: The patient and/or patient's family were  counseled regarding diagnostic results  Instructions for management,risk factor reductions,prognosis of disease were discussed  Patient and family were educated regarding impressions,risks and benefits of treatment options,importance of compliance with treatment      Total time of encounter:  30 min  More than 50% of the time was used in counseling and/or coordination of care  Extent of counseling and/or coordination of care        Candie Chahal MD  [facility] \[facility]\"" Neurology associates  [facility] [address]  [place] [address]  [phone]

## 2018-07-10 ENCOUNTER — OFFICE VISIT (OUTPATIENT)
Dept: NEUROLOGY | Facility: CLINIC | Age: 68
End: 2018-07-10
Payer: MEDICARE

## 2018-07-10 VITALS
BODY MASS INDEX: 24.65 KG/M2 | SYSTOLIC BLOOD PRESSURE: 114 MMHG | WEIGHT: 182 LBS | DIASTOLIC BLOOD PRESSURE: 76 MMHG | HEIGHT: 72 IN | HEART RATE: 74 BPM

## 2018-07-10 DIAGNOSIS — R94.01 ABNORMAL EEG: ICD-10-CM

## 2018-07-10 DIAGNOSIS — R56.9 SEIZURE (HCC): Primary | ICD-10-CM

## 2018-07-10 PROCEDURE — 99213 OFFICE O/P EST LOW 20 MIN: CPT | Performed by: PSYCHIATRY & NEUROLOGY

## 2018-07-10 RX ORDER — LEVETIRACETAM 500 MG/1
500 TABLET ORAL 2 TIMES DAILY
Qty: 180 TABLET | Refills: 1 | Status: SHIPPED | OUTPATIENT
Start: 2018-07-10 | End: 2018-11-19 | Stop reason: SDUPTHER

## 2018-10-31 NOTE — PROGRESS NOTES
Doing ok  fht's 140's  cx closed 60-3  iol mon   Return NeuroOutpatient Note        Dennise Jean  [id number]  17 y o   1950       Seizures        History obtained from:  Patient     HPI/Subjective:    Mr Marv Garduno is a 81 yo M with h/o seizure returns as follow up  He had presented to [facility] on Oct 16th 2017 with confusion  He woke up confused, very forgetful  He couldn't recall events from 2 days prior  His MRI brain was normal but his EEG had revealed right and left temporal lobe spikes and his event was thought to be from  a seizure  He was placed on Keppra 500mg bid and is taking it without any difficulty  He denies any seizure like activity since the hospital admission  He's currently doing well  He's currently unemployed  He used to work on a farm  Today he has no complaints  He just returns as follow up  He has one sister with h/o epilepsy since childhood  Past Medical History:   Diagnosis Date    New onset seizure [facility])      Social History     Social History    Marital status: Single     Spouse name: N/A    Number of children: N/A    Years of education: N/A     Occupational History    Not on file  Social History Main Topics    Smoking status: Never Smoker    Smokeless tobacco: Never Used    Alcohol use No    Drug use: No    Sexual activity: Not Currently     Other Topics Concern    Not on file     Social History Narrative    No narrative on file     Family History   Problem Relation Age of Onset    Multiple sclerosis Sister     Seizures Sister      No Known Allergies  Current Outpatient Prescriptions on File Prior to Visit   Medication Sig Dispense Refill    [DISCONTINUED] levETIRAcetam (KEPPRA) 500 mg tablet Take 1 tablet by mouth 2 (two) times a day       No current facility-administered medications on file prior to visit  Review of Systems   Refer to positive review of systems in HPI     Constitutional- No fever  Eyes- No visual change  ENT- Hearing normal  CV- No chest pain  Resp- No Shortness of breath  GI- No diarrhea  - Bladder normal  MS- No Arthritis   Skin- No rash  Psych- No depression  Endo- No DM  Heme- No nodes    Vitals:    07/10/18 1538   BP: 114/76   BP Location: Left arm   Patient Position: Sitting   Pulse: 74   Weight: 82 6 kg (182 lb)   Height: 6' (1 829 m)       PHYSICAL EXAM:  Appearance: No Acute Distress  Ophthalmoscopic: Disc Flat, Normal fundus  Mental status:  Orientation: Awake, Alert, and Orientedx3  Memory: Registation 3/3 Recall 3/3  Attention: normal  Knowledge: good  Language: No aphasia  Speech: No dysarthria  Cranial Nerves:  2 No Visual Defect on Confrontation, Pupils round, equal, reactive to light  3,4,6 Extraocular Movements Intact, no nystagmus  5 Facial Sensation Intact  7 No facial asymmetry  8 Intact hearing  9,10 Palate symmetric, normal gag  11 Good shoulder shrug  12 Tongue Midline  Gait: Stable  Coordination: No ataxia with finger to nose testing, and heel to shin  Sensory: Intact, Symmetric to pinprick, light touch, vibration, and joint position  Muscle Tone: Normal              Muscle exam:  Arm Right Left Leg Right Left   Deltoid 5/5 5/5 Iliopsoas 5/5 5/5   Biceps 5/5 5/5 Quads 5/5 5/5   Triceps 5/5 5/5 Hamstrings 5/5 5/5   Wrist Extension 5/5 5/5 Ankle Dorsi Flexion 5/5 5/5   Wrist Flexion 5/5 5/5 Ankle Plantar Flexion 5/5 5/5   Interossei 5/5 5/5 Ankle Eversion 5/5 5/5   APB 5/5 5/5 Ankle Inversion 5/5 5/5       Reflexes   RJ BJ TJ KJ AJ Plantars Coleman's   Right 2+ 2+ 2+ 2+ 2+ Downgoing Not present   Left 2+ 2+ 2+ 2+ 2+ Downgoing Not present     Personal review of  Labs:                  Diagnoses and all orders for this visit:        1  Seizure (HCC)  levETIRAcetam (KEPPRA) 500 mg tablet   2  Abnormal EEG  levETIRAcetam (KEPPRA) 500 mg tablet         Patient has remained seizure free since October 16th of 2017  He may resume driving now  Will resume Keppra 500mg bid     We discussed his tendency for seizures based on EEG finding and possible triggers to avoid  Referring him to Dr Jose Carlos Langford to establish care  Counseling Documentation:  The patient and/or patient's family were  counseled regarding diagnostic results  Instructions for management,risk factor reductions,prognosis of disease were discussed  Patient and family were educated regarding impressions,risks and benefits of treatment options,importance of compliance with treatment      Total time of encounter:  25 min  More than 50% of the time was used in counseling and/or coordination of care  Extent of counseling and/or coordination of care        MD A[facility]son [facility]  [facility] [address]  [place] [address]  [phone]

## 2018-11-19 DIAGNOSIS — R94.01 ABNORMAL EEG: ICD-10-CM

## 2018-11-19 DIAGNOSIS — R56.9 SEIZURE (HCC): ICD-10-CM

## 2018-11-23 RX ORDER — LEVETIRACETAM 500 MG/1
TABLET ORAL
Qty: 180 TABLET | Refills: 1 | Status: SHIPPED | OUTPATIENT
Start: 2018-11-23 | End: 2019-05-30 | Stop reason: SDUPTHER

## 2018-12-11 ENCOUNTER — OFFICE VISIT (OUTPATIENT)
Dept: FAMILY MEDICINE CLINIC | Facility: CLINIC | Age: 68
End: 2018-12-11
Payer: MEDICARE

## 2018-12-11 VITALS
BODY MASS INDEX: 26.77 KG/M2 | HEIGHT: 70 IN | RESPIRATION RATE: 18 BRPM | TEMPERATURE: 97 F | OXYGEN SATURATION: 99 % | DIASTOLIC BLOOD PRESSURE: 62 MMHG | WEIGHT: 187 LBS | SYSTOLIC BLOOD PRESSURE: 142 MMHG | HEART RATE: 94 BPM

## 2018-12-11 DIAGNOSIS — D64.9 ANEMIA, UNSPECIFIED TYPE: ICD-10-CM

## 2018-12-11 DIAGNOSIS — Z23 NEED FOR 23-POLYVALENT PNEUMOCOCCAL POLYSACCHARIDE VACCINE: ICD-10-CM

## 2018-12-11 DIAGNOSIS — N40.1 BENIGN PROSTATIC HYPERPLASIA WITH LOWER URINARY TRACT SYMPTOMS, SYMPTOM DETAILS UNSPECIFIED: ICD-10-CM

## 2018-12-11 DIAGNOSIS — R39.12 WEAK URINE STREAM: ICD-10-CM

## 2018-12-11 DIAGNOSIS — Z12.5 SCREENING FOR PROSTATE CANCER: ICD-10-CM

## 2018-12-11 DIAGNOSIS — Z23 NEED FOR IMMUNIZATION AGAINST INFLUENZA: ICD-10-CM

## 2018-12-11 DIAGNOSIS — Z00.00 WELL ADULT EXAM: Primary | ICD-10-CM

## 2018-12-11 DIAGNOSIS — R35.0 URINARY FREQUENCY: ICD-10-CM

## 2018-12-11 DIAGNOSIS — Z23 NEED FOR TETANUS, DIPHTHERIA, AND ACELLULAR PERTUSSIS (TDAP) VACCINE: ICD-10-CM

## 2018-12-11 DIAGNOSIS — Z12.11 SCREEN FOR COLON CANCER: ICD-10-CM

## 2018-12-11 PROCEDURE — 90471 IMMUNIZATION ADMIN: CPT | Performed by: FAMILY MEDICINE

## 2018-12-11 PROCEDURE — 90732 PPSV23 VACC 2 YRS+ SUBQ/IM: CPT | Performed by: FAMILY MEDICINE

## 2018-12-11 PROCEDURE — 90682 RIV4 VACC RECOMBINANT DNA IM: CPT | Performed by: FAMILY MEDICINE

## 2018-12-11 PROCEDURE — G0009 ADMIN PNEUMOCOCCAL VACCINE: HCPCS | Performed by: FAMILY MEDICINE

## 2018-12-11 PROCEDURE — 99213 OFFICE O/P EST LOW 20 MIN: CPT | Performed by: FAMILY MEDICINE

## 2018-12-11 PROCEDURE — 90715 TDAP VACCINE 7 YRS/> IM: CPT | Performed by: FAMILY MEDICINE

## 2018-12-11 PROCEDURE — G0008 ADMIN INFLUENZA VIRUS VAC: HCPCS | Performed by: FAMILY MEDICINE

## 2018-12-11 RX ORDER — TAMSULOSIN HYDROCHLORIDE 0.4 MG/1
0.4 CAPSULE ORAL
Qty: 30 CAPSULE | Refills: 0 | Status: SHIPPED | OUTPATIENT
Start: 2018-12-11 | End: 2019-01-24 | Stop reason: SDUPTHER

## 2018-12-11 NOTE — PROGRESS NOTES
ASSESSMENT & PLAN    Diagnoses and all orders for this visit:    Screen for colon cancer  -     Ambulatory referral to Gastroenterology; Future    Urinary frequency/weak urine stream  Likely 2/2 to BPH  PSA pending  Patient declined prostate exam during this visit  Patient was unable to provide sample of urine in office will send out lap collect  -     tamsulosin (FLOMAX) 0 4 mg; Take 1 capsule (0 4 mg total) by mouth daily with dinner  -     PSA, total and free; Future  -     UA w Reflex to Microscopic w Reflex to Culture -Lab Collect   Screening for prostate cancer  -     PSA, total and free; Future    Anemia, unspecified type  -     CBC and Platelet; Future    Need for immunization against influenza  -     SYRINGE/SINGLE-DOSE VIAL: influenza vaccine, [id number], quadrivalent, 0 5 mL, preservative-free (AFLURIA, FLUARIX, FLULAVAL, FLUZONE)    Need for 23-polyvalent pneumococcal polysaccharide vaccine  -     PNEUMOCOCCAL POLYSACCHARIDE VACCINE 23-VALENT =>3YO SQ IM    Need for tetanus, diphtheria, and acellular pertussis (Tdap) vaccine  -     TDAP VACCINE GREATER THAN OR EQUAL TO 6YO IM    Patient return to clinic for full skin exam     Assessment/Plan     Counseled on lifestyle modifications related to diet and exercise to include, but not limited to: Increased consumption of fruits & vegetables, decreased consumption of processed foods (fast food, junk food, soda), increased daily water intake, goal physical activity of 3 times weekly at least 30 minutes in duration and at a minimum of moderate intensity  Jes Salgado acknowledged understanding and agreement with the treatment plan  No Follow-up on file  SUBJECTIVE    HPI:    Patient was recently hospitalized at [facility] in October 2018 for altered mental status and amnesia  Patient is being followed by Neurology for seizure activity  Patient is on daily Keppra    Patient has routine follow-up with Neurology next appointment is in January 2019  Patient has not reported any seizure since hospitalization  Patient is here to establish care  He reestablished with medical care after his hospitalization in October  Visit Type: Health Maintenance  Last Health Maintenance Visit: none    Social History   Marital Status: Single    Household Members: self    Employment Status: labor farm    Tobacco Use: No        Alcohol Use: No        Drug Use: No         General Reported Health:    Dental: Brushes teeth 1 time(s) per day, flosses No time(s) per day, last dental  visit approximately 2-3 ago   Vision: Last eye exam approximately 2-3 ago   Hearing Loss: No   Immunizations: needs Influenza, Dtap, pnuemovax     Lifestyle     Diet:    Fruits & Vegetables 1-2 time(s) a day    Protein 3 time(s) a day    Water intake of 12oz  per day    Soda Intake: No    Fast Food: Minimal     Junk Food: Minimal    Exercise:    Weekly Exercise: 0 time(s) per week        Reproductive Health   Sexually Active: No   Contraception: none   STD Testing: not indicated     Screening   Colon Cancer: indicated   Prostate Cancer: indicated    Lung Cancer Screening:not indicated     Other HPI:    None  Complains of frequent urination  When he does go his stream is weak and he feels like he is not completely emptying  No FMHx of prostate cancer  No hematuria, dysuria, pyuria  Reviewed, and if applicable, updated allergies, medications, past medical history, past surgical history, family history, social history, problem list      Review of Systems   Constitution: Negative for chills, decreased appetite, diaphoresis, fever, weakness, night sweats, weight gain and weight loss  HENT: Positive for tinnitus  Negative for congestion, hearing loss, sore throat and stridor  Cardiovascular: Negative for chest pain, claudication, dyspnea on exertion, irregular heartbeat, orthopnea, palpitations and syncope     Respiratory: Negative for cough, shortness of breath, sleep disturbances due to breathing, snoring and wheezing  Hematologic/Lymphatic: Negative for adenopathy and bleeding problem  Does not bruise/bleed easily  Genitourinary: Positive for frequency and urgency  Neurological: Negative for dizziness, focal weakness, headaches, light-headedness and numbness  Psychiatric/Behavioral: Negative for altered mental status and depression  The patient is not nervous/anxious  OBJECTIVE    Vitals:    12/11/18 0826   Pulse: 94   Resp: 18   Temp: (!) 97 °F (36 1 °C)   SpO2: 99%       Physical Exam   Constitutional: He is oriented to person, place, and time  He appears well-developed and well-nourished  No distress  HENT:   Head: Normocephalic and atraumatic  Right Ear: External ear normal    Left Ear: External ear normal    Nose: Nose normal    Mouth/Throat: Oropharynx is clear and moist  No oropharyngeal exudate  Eyes: Pupils are equal, round, and reactive to light  Conjunctivae and EOM are normal  Right eye exhibits no discharge  Left eye exhibits no discharge  No scleral icterus  Neck: Normal range of motion  No tracheal deviation present  No thyromegaly present  Cardiovascular: Normal rate, regular rhythm, normal heart sounds and intact distal pulses  Exam reveals no gallop and no friction rub  No murmur heard  Pulmonary/Chest: Effort normal and breath sounds normal  No stridor  No respiratory distress  He has no wheezes  He has no rales  Abdominal: Soft  Bowel sounds are normal  He exhibits no distension  There is no tenderness  There is no rebound and no guarding  Genitourinary:   Genitourinary Comments: Deferred per patient preference    Musculoskeletal: Normal range of motion  He exhibits no edema, tenderness or deformity  Lymphadenopathy:     He has no cervical adenopathy  Neurological: He is alert and oriented to person, place, and time  No cranial nerve deficit  Coordination normal    Skin: Skin is warm and dry  No rash noted  He is not diaphoretic   No erythema  No pallor  Multiple moles on extremities  Psychiatric: He has a normal mood and affect   His behavior is normal

## 2019-01-08 ENCOUNTER — TRANSCRIBE ORDERS (OUTPATIENT)
Dept: ADMINISTRATIVE | Facility: HOSPITAL | Age: 69
End: 2019-01-08

## 2019-01-08 ENCOUNTER — OFFICE VISIT (OUTPATIENT)
Dept: NEUROLOGY | Facility: CLINIC | Age: 69
End: 2019-01-08
Payer: MEDICARE

## 2019-01-08 ENCOUNTER — APPOINTMENT (OUTPATIENT)
Dept: LAB | Facility: HOSPITAL | Age: 69
End: 2019-01-08
Payer: MEDICARE

## 2019-01-08 VITALS
SYSTOLIC BLOOD PRESSURE: 110 MMHG | WEIGHT: 188 LBS | HEART RATE: 94 BPM | BODY MASS INDEX: 26.92 KG/M2 | RESPIRATION RATE: 18 BRPM | DIASTOLIC BLOOD PRESSURE: 62 MMHG | HEIGHT: 70 IN

## 2019-01-08 DIAGNOSIS — R41.3 SHORT-TERM MEMORY LOSS: ICD-10-CM

## 2019-01-08 DIAGNOSIS — R94.01 ABNORMAL EEG: ICD-10-CM

## 2019-01-08 DIAGNOSIS — D64.9 ANEMIA, UNSPECIFIED TYPE: ICD-10-CM

## 2019-01-08 DIAGNOSIS — I10 ESSENTIAL HYPERTENSION: ICD-10-CM

## 2019-01-08 DIAGNOSIS — E55.9 VITAMIN D DEFICIENCY: ICD-10-CM

## 2019-01-08 DIAGNOSIS — R39.12 WEAK URINARY STREAM: Primary | ICD-10-CM

## 2019-01-08 DIAGNOSIS — G31.84 MCI (MILD COGNITIVE IMPAIRMENT): ICD-10-CM

## 2019-01-08 DIAGNOSIS — Z87.898 HISTORY OF SEIZURE: Primary | ICD-10-CM

## 2019-01-08 DIAGNOSIS — R39.12 WEAK URINE STREAM: ICD-10-CM

## 2019-01-08 DIAGNOSIS — R35.0 URINARY FREQUENCY: ICD-10-CM

## 2019-01-08 DIAGNOSIS — Z12.5 SCREENING FOR PROSTATE CANCER: ICD-10-CM

## 2019-01-08 LAB
25(OH)D3 SERPL-MCNC: 31.9 NG/ML (ref 30–100)
BILIRUB UR QL STRIP: NEGATIVE
BUN SERPL-MCNC: 23 MG/DL (ref 5–25)
CLARITY UR: CLEAR
COLOR UR: NORMAL
CREAT SERPL-MCNC: 1.03 MG/DL (ref 0.6–1.3)
ERYTHROCYTE [DISTWIDTH] IN BLOOD BY AUTOMATED COUNT: 12.6 % (ref 11.6–15.1)
GFR SERPL CREATININE-BSD FRML MDRD: 75 ML/MIN/1.73SQ M
GLUCOSE UR STRIP-MCNC: NEGATIVE MG/DL
HCT VFR BLD AUTO: 43.3 % (ref 36.5–49.3)
HGB BLD-MCNC: 13.7 G/DL (ref 12–17)
HGB UR QL STRIP.AUTO: NEGATIVE
KETONES UR STRIP-MCNC: NEGATIVE MG/DL
LEUKOCYTE ESTERASE UR QL STRIP: NEGATIVE
MCH RBC QN AUTO: 31.1 PG (ref 26.8–34.3)
MCHC RBC AUTO-ENTMCNC: 31.6 G/DL (ref 31.4–37.4)
MCV RBC AUTO: 98 FL (ref 82–98)
NITRITE UR QL STRIP: NEGATIVE
PH UR STRIP.AUTO: 6.5 [PH] (ref 5–9)
PLATELET # BLD AUTO: 234 THOUSANDS/UL (ref 149–390)
PMV BLD AUTO: 9.6 FL (ref 8.9–12.7)
PROT UR STRIP-MCNC: NEGATIVE MG/DL
RBC # BLD AUTO: 4.41 MILLION/UL (ref 3.88–5.62)
SP GR UR STRIP.AUTO: 1.02 (ref 1–1.03)
TSH SERPL DL<=0.05 MIU/L-ACNC: 1.88 UIU/ML (ref 0.36–3.74)
UROBILINOGEN UR QL STRIP.AUTO: 0.2 E.U./DL
VIT B12 SERPL-MCNC: 367 PG/ML (ref 100–900)
WBC # BLD AUTO: 4.85 THOUSAND/UL (ref 4.31–10.16)

## 2019-01-08 PROCEDURE — 82565 ASSAY OF CREATININE: CPT

## 2019-01-08 PROCEDURE — 99215 OFFICE O/P EST HI 40 MIN: CPT | Performed by: PSYCHIATRY & NEUROLOGY

## 2019-01-08 PROCEDURE — 84154 ASSAY OF PSA FREE: CPT

## 2019-01-08 PROCEDURE — 84520 ASSAY OF UREA NITROGEN: CPT

## 2019-01-08 PROCEDURE — 85027 COMPLETE CBC AUTOMATED: CPT

## 2019-01-08 PROCEDURE — 81003 URINALYSIS AUTO W/O SCOPE: CPT | Performed by: FAMILY MEDICINE

## 2019-01-08 PROCEDURE — 84153 ASSAY OF PSA TOTAL: CPT

## 2019-01-08 PROCEDURE — 36415 COLL VENOUS BLD VENIPUNCTURE: CPT

## 2019-01-08 PROCEDURE — 86592 SYPHILIS TEST NON-TREP QUAL: CPT

## 2019-01-08 PROCEDURE — 84443 ASSAY THYROID STIM HORMONE: CPT

## 2019-01-08 PROCEDURE — 82607 VITAMIN B-12: CPT

## 2019-01-08 PROCEDURE — 82306 VITAMIN D 25 HYDROXY: CPT

## 2019-01-08 NOTE — PROGRESS NOTES
Return NeuroOutpatient Note        Roseanne Walker  [id number]  8 y o   1950       Seizures        History obtained from:  Patient     HPI/Subjective:    Mr Esther Hester is a 79 yo M with h/o seizure returns as follow up  He had presented to [facility] on Oct 16th 2017 with confusion  He woke up confused, very forgetful  He couldn't recall events from 2 days prior  His MRI brain was normal but his EEG had revealed right and left temporal lobe spikes and his event was thought to be from a seizure  He was placed on Keppra 500mg bid and is taking it without any difficulty  He denies any seizure like activity since the hospital admission  He's currently doing well  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  He has no medical complaints however when we asked he has noticed some short term memory problems  He says that if he looked at numbers, wouldn't remember few minutes later  Denies significant problem with ADLs  Denies difficulty with driving or with finances  He still pays his bills  He has one sister with h/o epilepsy since childhood  Past Medical History:   Diagnosis Date    New onset seizure [facility])      Social History     Social History    Marital status: Single     Spouse name: N/A    Number of children: N/A    Years of education: N/A     Occupational History    Not on file       Social History Main Topics    Smoking status: Never Smoker    Smokeless tobacco: Never Used    Alcohol use No    Drug use: No    Sexual activity: Not Currently     Other Topics Concern    Not on file     Social History Narrative    No narrative on file     Family History   Problem Relation Age of Onset    No Known Problems Mother     No Known Problems Father     Multiple sclerosis Sister     Seizures Sister      No Known Allergies  Current Outpatient Prescriptions on File Prior to Visit   Medication Sig Dispense Refill    levETIRAcetam (KEPPRA) 500 mg tablet TAKE ONE TABLET TWICE DAILY 180 tablet 1    tamsulosin (FLOMAX) 0 4 mg Take 1 capsule (0 4 mg total) by mouth daily with dinner 30 capsule 0     No current facility-administered medications on file prior to visit  Review of Systems   Refer to positive review of systems in HPI     Review of Systems    Constitutional- No fever  Eyes- No visual change  ENT- Hearing normal  CV- No chest pain  Resp- No Shortness of breath  GI- No diarrhea  - Bladder normal  MS- No Arthritis   Skin- No rash  Psych- No depression  Endo- No DM  Heme- No nodes    Vitals:    01/08/19 1330   BP: 110/62   BP Location: Left arm   Patient Position: Sitting   Cuff Size: Adult   Pulse: 94   Resp: 18   Weight: 85 3 kg (188 lb)   Height: 5' 10" (1 778 m)       PHYSICAL EXAM:  Appearance: No Acute Distress  Ophthalmoscopic: Disc Flat, Normal fundus  Mental status:  Orientation: Awake, Alert, and Orientedx3  Memory: Registation 3/3 Recall 2/3  MMSE: 25/30  Attention: normal  Knowledge: good  Language: No aphasia  Speech: No dysarthria  Cranial Nerves:  2 No Visual Defect on Confrontation, Pupils round, equal, reactive to light  3,4,6 Extraocular Movements Intact, no nystagmus  5 Facial Sensation Intact  7 No facial asymmetry  8 Intact hearing  9,10 Palate symmetric, normal gag  11 Good shoulder shrug  12 Tongue Midline  Gait: Stable  Coordination: No ataxia with finger to nose testing, and heel to shin  Sensory: Intact, Symmetric to pinprick, light touch, vibration, and joint position  Muscle Tone: Normal              Muscle exam:  Arm Right Left Leg Right Left   Deltoid 5/5 5/5 Iliopsoas 5/5 5/5   Biceps 5/5 5/5 Quads 5/5 5/5   Triceps 5/5 5/5 Hamstrings 5/5 5/5   Wrist Extension 5/5 5/5 Ankle Dorsi Flexion 5/5 5/5   Wrist Flexion 5/5 5/5 Ankle Plantar Flexion 5/5 5/5   Interossei 5/5 5/5 Ankle Eversion 5/5 5/5   APB 5/5 5/5 Ankle Inversion 5/5 5/5       Reflexes   RJ BJ TJ KJ AJ Plantars Coleman's   Right 2+ 2+ 2+ 2+ 2+ Downgoing Not present   Left 2+ 2+ 2+ 2+ 2+ Downgoing Not present     Personal review of  Labs:                    Diagnoses and all orders for this visit:        1  History of seizure  MRI brain memory wo and w contrast    Ambulatory referral to Psychology   2  Abnormal EEG     3  MCI (mild cognitive impairment)  MRI brain memory wo and w contrast    Ambulatory referral to Psychology    Vitamin B12    Vitamin D 25 hydroxy    TSH, 3rd generation with Free T4 reflex    RPR   4  Short-term memory loss  MRI brain memory wo and w contrast    Ambulatory referral to Psychology    Vitamin B12    Vitamin D 25 hydroxy    TSH, 3rd generation with Free T4 reflex    RPR   5  Vitamin D deficiency   Vitamin D 25 hydroxy       Patient has remained stable in terms of seizure activity  Will resume Keppra 500mg bid  For memory evaluation, will check for reversible causes  Will get one time MRI brain memory and refer him for neuro psych testing  Encouraged games such as word search, cross word puzzles to keep his mind sharp               Total time of encounter:  40 min  More than 50% of the time was used in counseling and/or coordination of care  Extent of counseling and/or coordination of care        MD Chalino Valdovinos [facility]  [address]  [address]  [phone]

## 2019-01-09 LAB — RPR SER QL: NORMAL

## 2019-01-10 LAB
PSA FREE MFR SERPL: 20.1 %
PSA FREE SERPL-MCNC: 1.39 NG/ML
PSA SERPL-MCNC: 6.9 NG/ML (ref 0–4)

## 2019-01-17 ENCOUNTER — HOSPITAL ENCOUNTER (OUTPATIENT)
Dept: RADIOLOGY | Facility: HOSPITAL | Age: 69
Discharge: HOME | End: 2019-01-17
Attending: PSYCHIATRY & NEUROLOGY
Payer: MEDICARE

## 2019-01-17 DIAGNOSIS — G31.84 MCI (MILD COGNITIVE IMPAIRMENT): ICD-10-CM

## 2019-01-17 DIAGNOSIS — R41.3 SHORT-TERM MEMORY LOSS: ICD-10-CM

## 2019-01-17 DIAGNOSIS — Z87.898 HISTORY OF SEIZURE: ICD-10-CM

## 2019-01-17 PROCEDURE — 70553 MRI BRAIN STEM W/O & W/DYE: CPT

## 2019-01-17 PROCEDURE — A9585 GADOBUTROL INJECTION: HCPCS | Performed by: PSYCHIATRY & NEUROLOGY

## 2019-01-17 RX ADMIN — GADOBUTROL 9 ML: 604.72 INJECTION INTRAVENOUS at 12:42

## 2019-01-24 ENCOUNTER — OFFICE VISIT (OUTPATIENT)
Dept: FAMILY MEDICINE CLINIC | Facility: CLINIC | Age: 69
End: 2019-01-24
Payer: MEDICARE

## 2019-01-24 VITALS
BODY MASS INDEX: 26.05 KG/M2 | HEART RATE: 88 BPM | SYSTOLIC BLOOD PRESSURE: 140 MMHG | RESPIRATION RATE: 18 BRPM | WEIGHT: 182 LBS | OXYGEN SATURATION: 98 % | DIASTOLIC BLOOD PRESSURE: 74 MMHG | HEIGHT: 70 IN

## 2019-01-24 DIAGNOSIS — R39.12 WEAK URINE STREAM: ICD-10-CM

## 2019-01-24 DIAGNOSIS — R35.0 URINARY FREQUENCY: ICD-10-CM

## 2019-01-24 DIAGNOSIS — R97.20 ELEVATED PSA: ICD-10-CM

## 2019-01-24 DIAGNOSIS — N40.0 ENLARGED PROSTATE: Primary | ICD-10-CM

## 2019-01-24 DIAGNOSIS — N40.1 BENIGN PROSTATIC HYPERPLASIA WITH LOWER URINARY TRACT SYMPTOMS, SYMPTOM DETAILS UNSPECIFIED: ICD-10-CM

## 2019-01-24 PROCEDURE — 99213 OFFICE O/P EST LOW 20 MIN: CPT | Performed by: FAMILY MEDICINE

## 2019-01-24 RX ORDER — TAMSULOSIN HYDROCHLORIDE 0.4 MG/1
0.4 CAPSULE ORAL
Qty: 30 CAPSULE | Refills: 3 | Status: SHIPPED | OUTPATIENT
Start: 2019-01-24

## 2019-01-24 NOTE — PROGRESS NOTES
2 Dee Dee Bhakta 80 y o  male  MRN [id number]    Assessment/Plan    Diagnoses and all orders for this visit:    Elevated PSA:   resulted 6 9  Prostate enlarged on exam  No discrete mass  could not palpate superior portion  Although patient is responding to Flomax symptomatically  Cannot rule out prostate CA  -     Ambulatory referral to Urology; Future    Benign prostatic hyperplasia with lower urinary tract symptoms, symptom details unspecified  -     tamsulosin (FLOMAX) 0 4 mg; Take 1 capsule (0 4 mg total) by mouth daily with dinner    Enlarged prostate  -     Ambulatory referral to Urology; Future    Follow up 2 months  Patient given opportunity during exam to ask any questions or voice concerns they may have  All questions answered and concerns addressed  Advised patient that if they have any questions after this office visit they are more than welcome to contact CFP/myself for further clarification  CFP on call provider emergency telephone contact information provided to patient  Letitia Garcia MD    Subjective     HPI  Myra Peers 80 y o  male, PMHx seizure, memory loss, presents to clinic for evaluation of blood work and medication  Patient was last seen in Dec 2018  At that time patient complained of increased urinary frequency, weak steam, dribbling at the end of urination, frequent night time urination  At the time patient declined prostate exam  Patient was give requisition for PSA  He was started on Flomax, for presumptive BPH  Patient states his symptoms have improved greatly  He only gets up on average 1 time a night to urinate  His frequency has decreased  His urinary stream has improved  PSA was elevated 6 9  Denies hematuria, lyphmadenopathy, recent weight loss       Past Medical History:   Diagnosis Date    New onset seizure [facility])        Past Surgical History:   Procedure Laterality Date    CHOLECYSTECTOMY      FRACTURE SURGERY      Left femur fx       Family History   Problem Relation Age of Onset    No Known Problems Mother     No Known Problems Father     Multiple sclerosis Sister     Seizures Sister        History   Alcohol Use No       History   Drug Use No       History   Smoking Status    Never Smoker   Smokeless Tobacco    Never Used       Social History     No Known Allergies    The following portions of the patient's history were reviewed and updated as appropriate: allergies, current medications, past family history, past medical history, past social history, past surgical history and problem list       Current Outpatient Prescriptions:     levETIRAcetam (KEPPRA) 500 mg tablet, TAKE ONE TABLET TWICE DAILY, Disp: 180 tablet, Rfl: 1    tamsulosin (FLOMAX) 0 4 mg, Take 1 capsule (0 4 mg total) by mouth daily with dinner, Disp: 30 capsule, Rfl: 3    ROS  Review of Systems   Constitutional: Negative  HENT: Negative  Respiratory: Negative  Gastrointestinal: Negative  Genitourinary: Negative for decreased urine volume, difficulty urinating, dysuria, flank pain, frequency, hematuria, scrotal swelling, testicular pain and urgency  Neurological: Negative  Hematological: Negative for adenopathy  Objective    Physical exam    Blood pressure 140/74, pulse 88, resp  rate 18, height 5' 10" (1 778 m), weight 82 6 kg (182 lb), SpO2 98 %  Physical Exam   Constitutional: He is oriented to person, place, and time  He appears well-developed and well-nourished  No distress  HENT:   Head: Normocephalic and atraumatic  Right Ear: External ear normal    Left Ear: External ear normal    Eyes: Pupils are equal, round, and reactive to light  Conjunctivae and EOM are normal  No scleral icterus  Neck: Normal range of motion  Cardiovascular: Normal rate, regular rhythm and normal heart sounds  Pulmonary/Chest: Effort normal  No respiratory distress  He has no wheezes  Genitourinary: Prostate is enlarged     Genitourinary Comments: Prostate smooth and firm  No masses noted  Cannot appreciate superior portion  Lymphadenopathy:     He has no cervical adenopathy  Neurological: He is alert and oriented to person, place, and time  Skin: He is not diaphoretic

## 2019-03-18 ENCOUNTER — OFFICE VISIT (OUTPATIENT)
Dept: FAMILY MEDICINE CLINIC | Facility: CLINIC | Age: 69
End: 2019-03-18
Payer: MEDICARE

## 2019-03-18 VITALS
TEMPERATURE: 98.8 F | RESPIRATION RATE: 18 BRPM | WEIGHT: 191 LBS | SYSTOLIC BLOOD PRESSURE: 122 MMHG | HEART RATE: 83 BPM | DIASTOLIC BLOOD PRESSURE: 82 MMHG | OXYGEN SATURATION: 97 % | BODY MASS INDEX: 27.41 KG/M2

## 2019-03-18 DIAGNOSIS — N40.1 BENIGN PROSTATIC HYPERPLASIA WITH LOWER URINARY TRACT SYMPTOMS, SYMPTOM DETAILS UNSPECIFIED: ICD-10-CM

## 2019-03-18 DIAGNOSIS — M54.50 ACUTE RIGHT-SIDED LOW BACK PAIN WITHOUT SCIATICA: Primary | ICD-10-CM

## 2019-03-18 PROCEDURE — 99213 OFFICE O/P EST LOW 20 MIN: CPT | Performed by: FAMILY MEDICINE

## 2019-03-18 NOTE — PROGRESS NOTES
2 Dee Dee Bhakta 80 y o  male  MRN [id number]    Assessment/Plan    Diagnoses and all orders for this visit:    Acute right-sided low back pain without sciatica  Patient declines physical therapy  Will provide patient with daily stretching and exercises  Can take NSAIDs or Tylenol for pain  Advised intermittent heat applied to the area p r n  for pain  Proper lifting techniques advised  Return to clinic if there is any worsening of condition  Benign prostatic hyperplasia with lower urinary tract symptoms, symptom details unspecified  Follow-up with Urology  Continue Flomax  Patient had elevated PSA of 6 9  Records to be sent to urologist     Follow up p r n  Nena Gilliam Patient given opportunity during exam to ask any questions or voice concerns they may have  All questions answered and concerns addressed  Advised patient that if they have any questions after this office visit they are more than welcome to contact CFP/myself for further clarification  CFP on call provider emergency telephone contact information provided to patient  Elvin Sims MD    Subjective     HPI  Oscar Branch 80 y o  male, PMHx Seizure, BPH, presents to clinic for evaluation of lower back pain and BPH  Patient states that he saw Urology  Flomax was increased  Patient states that he has had symptom relief with Flomax  He no longer has a weak stream   He is dribbling last   He only gets up on average once a night to urinate  He notes no dysuria, pyuria, hematuria  Patient states he is having right lower back pain  He states that this is not bothering him much  There is no radiation of pain  Pain is dull and aching  Gets worse with movement and better with rest   Patient has not taken any medication for this  He is able to complete his ADLs without difficulty      Past Medical History:   Diagnosis Date    New onset seizure [facility])        Past Surgical History:   Procedure Laterality Date    CHOLECYSTECTOMY  FRACTURE SURGERY      Left femur fx       Family History   Problem Relation Age of Onset    No Known Problems Mother     No Known Problems Father     Multiple sclerosis Sister     Seizures Sister        Social History     Substance and Sexual Activity   Alcohol Use No       Social History     Substance and Sexual Activity   Drug Use No       Social History     Tobacco Use   Smoking Status Never Smoker   Smokeless Tobacco Never Used       Social History     No Known Allergies    The following portions of the patient's history were reviewed and updated as appropriate: allergies, current medications, past family history, past medical history, past social history, past surgical history and problem list       Current Outpatient Medications:     levETIRAcetam (KEPPRA) 500 mg tablet, TAKE ONE TABLET TWICE DAILY, Disp: 180 tablet, Rfl: 1    tamsulosin (FLOMAX) 0 4 mg, Take 1 capsule (0 4 mg total) by mouth daily with dinner (Patient taking differently: Take 0 8 mg by mouth daily with dinner ), Disp: 30 capsule, Rfl: 3    ROS  Review of Systems   Constitutional: Negative for chills, diaphoresis, fatigue and fever  HENT: Negative  Eyes: Negative  Respiratory: Negative  Cardiovascular: Negative  Gastrointestinal: Negative  Genitourinary: Negative  Negative for difficulty urinating, dysuria, flank pain, frequency, hematuria, penile pain and urgency  Musculoskeletal: Positive for back pain  Skin: Negative  Objective    Physical exam    Blood pressure 122/82, pulse 83, temperature 98 8 °F (37 1 °C), temperature source Tympanic, resp  rate 18, weight 86 6 kg (191 lb), SpO2 97 %  Physical Exam   Constitutional: He appears well-developed and well-nourished  No distress  HENT:   Head: Normocephalic and atraumatic  Eyes: Pupils are equal, round, and reactive to light  Conjunctivae and EOM are normal  No scleral icterus  Neck: Normal range of motion  No JVD present   No thyromegaly present  Cardiovascular: Normal rate, regular rhythm and normal heart sounds  Pulmonary/Chest: Effort normal and breath sounds normal  No stridor  No respiratory distress  Abdominal: Soft  He exhibits no distension  Musculoskeletal: Normal range of motion  He exhibits no edema, tenderness or deformity  Neurological: He is alert  No cranial nerve deficit  Skin: He is not diaphoretic

## 2019-03-18 NOTE — PATIENT INSTRUCTIONS
Lower Back Exercises   WHAT YOU NEED TO KNOW:   Lower back exercises help heal and strengthen your back muscles to prevent another injury  Ask your healthcare provider if you need to see a physical therapist for more advanced exercises  DISCHARGE INSTRUCTIONS:   Return to the emergency department if:   · You have severe pain that prevents you from moving  Contact your healthcare provider if:   · Your pain becomes worse  · You have new pain  · You have questions or concerns about your condition or care  Do lower back exercises safely:   · Do the exercises on a mat or firm surface  (not on a bed) to support your spine and prevent low back pain  · Move slowly and smoothly  Avoid fast or jerky motions  · Breathe normally  Do not hold your breath  · Stop if you feel pain  It is normal to feel some discomfort at first  Regular exercise will help decrease your discomfort over time  Lower back exercises: Your healthcare provider may recommend that you do back exercises 10 to 30 minutes each day  He may also recommend that you do exercises 1 to 3 times each day  Ask your healthcare provider which exercises are best for you and how often to do them  · Ankle pumps:  Lie on your back  Move your foot up (with your toes pointing toward your head)  Then, move your foot down (with your toes pointing away from you)  Repeat this exercise 10 times on each side  · Heel slides:  Lie on your back  Slowly bend one leg and then straighten it  Next, bend the other leg and then straighten it  Repeat 10 times on each side  · Pelvic tilt:  Lie on your back with your knees bent and feet flat on the floor  Place your arms in a relaxed position beside your body  Tighten the muscles of your abdomen and flatten your back against the floor  Hold for 5 seconds  Repeat 5 times  · Back stretch:  Lie on your back with your hands behind your head   Bend your knees and turn the lower half of your body to one side  Hold this position for 10 seconds  Repeat 3 times on each side  · Straight leg raises:  Lie on your back with one leg straight  Bend the other knee  Tighten your abdomen and then slowly lift the straight leg up about 6 to 12 inches off the floor  Hold for 1 to 5 seconds  Lower your leg slowly  Repeat 10 times on each leg  · Knee-to-chest:  Lie on your back with your knees bent and feet flat on the floor  Pull one of your knees toward your chest and hold it there for 5 seconds  Return your leg to the starting position  Lift the other knee toward your chest and hold for 5 seconds  Do this 5 times on each side  · Cat and camel:  Place your hands and knees on the floor  Arch your back upward toward the ceiling and lower your head  Round out your spine as much as you can  Hold for 5 seconds  Lift your head upward and push your chest downward toward the floor  Hold for 5 seconds  Do 3 sets or as directed  · Wall squats:  Stand with your back against a wall  Tighten the muscles of your abdomen  Slowly lower your body until your knees are bent at a 45 degree angle  Hold this position for 5 seconds  Slowly move back up to a standing position  Repeat 10 times  · Curl up:  Lie on your back with your knees bent and feet flat on the floor  Place your hands, palms down, underneath the curve in your lower back  Next, with your elbows on the floor, lift your shoulders and chest 2 to 3 inches  Keep your head in line with your shoulders  Hold this position for 5 seconds  When you can do this exercise without pain for 10 to 15 seconds, you may add a rotation  While your shoulders and chest are lifted off the ground, turn slightly to the left and hold  Repeat on the other side  · Bird dog:  Place your hands and knees on the floor  Keep your wrists directly below your shoulders and your knees directly below your hips   Pull your belly button in toward your spine  Do not flatten or arch your back  Tighten your abdominal muscles  Raise one arm straight out so that it is aligned with your head  Next, raise the leg opposite your arm  Hold this position for 15 seconds  Lower your arm and leg slowly and change sides  Do 5 sets  © 2017 [address] [employer] Information is for End User's use only and may not be sold, redistributed or otherwise used for commercial purposes  All illustrations and images included in CareNotes® are the copyrighted property of [employer] [facility] , [employer]  or Chuckie Sepulveda  The above information is an  only  It is not intended as medical advice for individual conditions or treatments  Talk to your doctor, nurse or pharmacist before following any medical regimen to see if it is safe and effective for you

## 2019-03-19 PROBLEM — N40.0 BENIGN PROSTATIC HYPERPLASIA WITHOUT LOWER URINARY TRACT SYMPTOMS: Status: ACTIVE | Noted: 2019-03-19

## 2019-04-17 ENCOUNTER — TRANSCRIBE ORDERS (OUTPATIENT)
Dept: ADMINISTRATIVE | Facility: HOSPITAL | Age: 69
End: 2019-04-17

## 2019-04-17 ENCOUNTER — APPOINTMENT (OUTPATIENT)
Dept: LAB | Facility: HOSPITAL | Age: 69
End: 2019-04-17
Attending: SPECIALIST
Payer: MEDICARE

## 2019-04-17 DIAGNOSIS — R97.20 ELEVATED PROSTATE SPECIFIC ANTIGEN (PSA): Primary | ICD-10-CM

## 2019-04-17 DIAGNOSIS — R97.20 ELEVATED PROSTATE SPECIFIC ANTIGEN (PSA): ICD-10-CM

## 2019-04-17 LAB — PSA SERPL-MCNC: 8.1 NG/ML (ref 0–4)

## 2019-04-17 PROCEDURE — 84153 ASSAY OF PSA TOTAL: CPT

## 2019-05-30 DIAGNOSIS — R56.9 SEIZURE (HCC): ICD-10-CM

## 2019-05-30 DIAGNOSIS — R94.01 ABNORMAL EEG: ICD-10-CM

## 2019-05-30 RX ORDER — LEVETIRACETAM 500 MG/1
TABLET ORAL
Qty: 180 TABLET | Refills: 1 | Status: SHIPPED | OUTPATIENT
Start: 2019-05-30 | End: 2019-07-09 | Stop reason: SDUPTHER

## 2019-07-09 ENCOUNTER — OFFICE VISIT (OUTPATIENT)
Dept: NEUROLOGY | Facility: CLINIC | Age: 69
End: 2019-07-09
Payer: MEDICARE

## 2019-07-09 VITALS
SYSTOLIC BLOOD PRESSURE: 120 MMHG | BODY MASS INDEX: 26.77 KG/M2 | WEIGHT: 187 LBS | DIASTOLIC BLOOD PRESSURE: 72 MMHG | HEART RATE: 75 BPM | HEIGHT: 70 IN

## 2019-07-09 DIAGNOSIS — R94.01 ABNORMAL EEG: ICD-10-CM

## 2019-07-09 DIAGNOSIS — G31.84 MCI (MILD COGNITIVE IMPAIRMENT): Primary | ICD-10-CM

## 2019-07-09 DIAGNOSIS — Z87.898 HISTORY OF SEIZURE: ICD-10-CM

## 2019-07-09 DIAGNOSIS — R56.9 SEIZURE (HCC): ICD-10-CM

## 2019-07-09 PROCEDURE — 99214 OFFICE O/P EST MOD 30 MIN: CPT | Performed by: PSYCHIATRY & NEUROLOGY

## 2019-07-09 RX ORDER — LEVETIRACETAM 500 MG/1
500 TABLET ORAL EVERY 12 HOURS SCHEDULED
Qty: 180 TABLET | Refills: 1 | Status: SHIPPED | OUTPATIENT
Start: 2019-07-09 | End: 2020-01-09 | Stop reason: SDUPTHER

## 2019-07-09 RX ORDER — DONEPEZIL HYDROCHLORIDE 10 MG/1
10 TABLET, FILM COATED ORAL
Qty: 30 TABLET | Refills: 3 | Status: SHIPPED | OUTPATIENT
Start: 2019-07-09 | End: 2020-01-09

## 2019-07-09 RX ORDER — DONEPEZIL HYDROCHLORIDE 5 MG/1
5 TABLET, FILM COATED ORAL
Qty: 30 TABLET | Refills: 0 | Status: SHIPPED | OUTPATIENT
Start: 2019-07-09 | End: 2020-01-09

## 2019-07-09 NOTE — PROGRESS NOTES
Return NeuroOutpatient Note        Sigifredo Ohara  [id number]  42 y o   1950       Seizures        History obtained from:  Patient     HPI/Subjective:    Mr Renetta Byrnes is a 76 yo M with h/o seizure returns as follow up  He had presented to [facility] on Oct 16th 2017 with confusion  He woke up confused, very forgetful  He couldn't recall events from 2 days prior  His MRI brain was normal but his EEG had revealed right and left temporal lobe spikes and his event was thought to be from a seizure  He was placed on Keppra 500mg bid and is taking it without any difficulty  He denies any seizure like activity since the hospital admission  He's currently doing well  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  He has no medical complaints however when we asked he has noticed some short term memory problems  We had ordered neuro psych testing at last visit but he says he got busy and didn't get around to doing it  His MRI brain appeared stable  There was no significant atrophy  His labs were wnl  He did forget to pay taxes because said it was busy time for him  He has one sister with h/o epilepsy since childhood       Past Medical History:   Diagnosis Date    New onset seizure [facility])      Social History     Socioeconomic History    Marital status: Single     Spouse name: Not on file    Number of children: Not on file    Years of education: Not on file    Highest education level: Not on file   Occupational History    Not on file   Social Needs    Financial resource strain: Not on file    Food insecurity:     Worry: Not on file     Inability: Not on file    Transportation needs:     Medical: Not on file     Non-medical: Not on file   Tobacco Use    Smoking status: Never Smoker    Smokeless tobacco: Never Used   Substance and Sexual Activity    Alcohol use: No    Drug use: No    Sexual activity: Not Currently   Lifestyle    Physical activity:     Days per week: Not on file     Minutes per session: Not on file    Stress: Not on file   Relationships    Social connections:     Talks on phone: Not on file     Gets together: Not on file     Attends [employer] service: Not on file     Active member of club or organization: Not on file     Attends meetings of clubs or organizations: Not on file     Relationship status: Not on file    Intimate partner violence:     Fear of current or ex partner: Not on file     Emotionally abused: Not on file     Physically abused: Not on file     Forced sexual activity: Not on file   Other Topics Concern    Not on file   Social History Narrative    Not on file     Family History   Problem Relation Age of Onset    No Known Problems Mother     No Known Problems Father     Multiple sclerosis Sister     Seizures Sister      No Known Allergies  Current Outpatient Medications on File Prior to Visit   Medication Sig Dispense Refill    tamsulosin (FLOMAX) 0 4 mg Take 1 capsule (0 4 mg total) by mouth daily with dinner (Patient taking differently: Take 0 8 mg by mouth daily with dinner ) 30 capsule 3    [DISCONTINUED] levETIRAcetam (KEPPRA) 500 mg tablet TAKE ONE TABLET TWICE DAILY 180 tablet 1     No current facility-administered medications on file prior to visit  Review of Systems   Refer to positive review of systems in HPI  Review of Systems   All other systems reviewed and are negative            Vitals:    07/09/19 1341   BP: 120/72   BP Location: Left arm   Patient Position: Sitting   Cuff Size: Adult   Pulse: 75   Weight: 84 8 kg (187 lb)   Height: 5' 10" (1 778 m)       PHYSICAL EXAM:  Appearance: No Acute Distress  Ophthalmoscopic: Disc Flat, Normal fundus  Mental status:  Orientation: Awake, Alert, and Orientedx3  Memory: Registation 3/3 Recall 2/3  MMSE: 25/30  Attention: normal  Knowledge: good  Language: No aphasia  Speech: No dysarthria  Cranial Nerves:  2 No Visual Defect on Confrontation, Pupils round, equal, reactive to light  3,4,6 Extraocular Movements Intact, no nystagmus  5 Facial Sensation Intact  7 No facial asymmetry  8 Intact hearing  9,10 Palate symmetric, normal gag  11 Good shoulder shrug  12 Tongue Midline  Gait: Stable  Coordination: No ataxia with finger to nose testing, and heel to shin  Sensory: Intact, Symmetric to pinprick, light touch, vibration, and joint position  Muscle Tone: Normal              Muscle exam:  Arm Right Left Leg Right Left   Deltoid 5/5 5/5 Iliopsoas 5/5 5/5   Biceps 5/5 5/5 Quads 5/5 5/5   Triceps 5/5 5/5 Hamstrings 5/5 5/5   Wrist Extension 5/5 5/5 Ankle Dorsi Flexion 5/5 5/5   Wrist Flexion 5/5 5/5 Ankle Plantar Flexion 5/5 5/5   Interossei 5/5 5/5 Ankle Eversion 5/5 5/5   APB 5/5 5/5 Ankle Inversion 5/5 5/5       Reflexes   RJ BJ TJ KJ AJ Plantars Coleman's   Right 2+ 2+ 2+ 2+ 2+ Downgoing Not present   Left 2+ 2+ 2+ 2+ 2+ Downgoing Not present     Personal review of  Labs:                    Diagnoses and all orders for this visit:        1  MCI (mild cognitive impairment)     2  History of seizure  donepezil (ARICEPT) 5 mg tablet    donepezil (ARICEPT) 10 mg tablet   3  Seizure (HCC)  levETIRAcetam (KEPPRA) 500 mg tablet   4  Abnormal EEG  levETIRAcetam (KEPPRA) 500 mg tablet       Patient has remained stable in terms of seizure activity  Will resume Keppra 500mg bid  Encouraged getting neuropsych testing  He says he will have it done next winter  Patient does try to keep himself busy with word search, cross word puzzles to keep his mind sharp               Total time of encounter:  30 min  More than 50% of the time was used in counseling and/or coordination of care  Extent of counseling and/or coordination of care        Earnest Ocampo MD  [facility]  [address]  [address]  [phone]

## 2019-12-02 DIAGNOSIS — R94.01 ABNORMAL EEG: ICD-10-CM

## 2019-12-02 DIAGNOSIS — R56.9 SEIZURE (HCC): ICD-10-CM

## 2019-12-02 RX ORDER — LEVETIRACETAM 500 MG/1
TABLET ORAL
Qty: 180 TABLET | Refills: 1 | Status: SHIPPED | OUTPATIENT
Start: 2019-12-02 | End: 2020-01-09 | Stop reason: SDUPTHER

## 2019-12-30 ENCOUNTER — TRANSCRIBE ORDERS (OUTPATIENT)
Dept: ADMINISTRATIVE | Facility: HOSPITAL | Age: 69
End: 2019-12-30

## 2019-12-30 ENCOUNTER — APPOINTMENT (OUTPATIENT)
Dept: LAB | Facility: HOSPITAL | Age: 69
End: 2019-12-30
Attending: SPECIALIST
Payer: MEDICARE

## 2019-12-30 DIAGNOSIS — R97.20 ABNORMAL PSA: ICD-10-CM

## 2019-12-30 DIAGNOSIS — R97.20 ABNORMAL PSA: Primary | ICD-10-CM

## 2019-12-30 LAB — PSA SERPL-MCNC: 6.3 NG/ML (ref 0–4)

## 2019-12-30 PROCEDURE — 84153 ASSAY OF PSA TOTAL: CPT

## 2020-01-09 ENCOUNTER — OFFICE VISIT (OUTPATIENT)
Dept: NEUROLOGY | Facility: CLINIC | Age: 70
End: 2020-01-09
Payer: COMMERCIAL

## 2020-01-09 VITALS
WEIGHT: 190 LBS | HEIGHT: 70 IN | SYSTOLIC BLOOD PRESSURE: 131 MMHG | HEART RATE: 82 BPM | BODY MASS INDEX: 27.2 KG/M2 | DIASTOLIC BLOOD PRESSURE: 89 MMHG

## 2020-01-09 DIAGNOSIS — Z87.898 HISTORY OF SEIZURE: ICD-10-CM

## 2020-01-09 DIAGNOSIS — R56.9 SEIZURE (HCC): ICD-10-CM

## 2020-01-09 DIAGNOSIS — G31.84 MCI (MILD COGNITIVE IMPAIRMENT): Primary | ICD-10-CM

## 2020-01-09 DIAGNOSIS — R94.01 ABNORMAL EEG: ICD-10-CM

## 2020-01-09 PROCEDURE — 99214 OFFICE O/P EST MOD 30 MIN: CPT | Performed by: PSYCHIATRY & NEUROLOGY

## 2020-01-09 PROCEDURE — 1160F RVW MEDS BY RX/DR IN RCRD: CPT | Performed by: PSYCHIATRY & NEUROLOGY

## 2020-01-09 RX ORDER — DONEPEZIL HYDROCHLORIDE 10 MG/1
10 TABLET, FILM COATED ORAL
Qty: 90 TABLET | Refills: 1 | Status: SHIPPED | OUTPATIENT
Start: 2020-01-09 | End: 2020-07-09 | Stop reason: SDUPTHER

## 2020-01-09 RX ORDER — LEVETIRACETAM 500 MG/1
500 TABLET ORAL EVERY 12 HOURS SCHEDULED
Qty: 180 TABLET | Refills: 1 | Status: SHIPPED | OUTPATIENT
Start: 2020-01-09 | End: 2020-07-09 | Stop reason: SDUPTHER

## 2020-01-09 NOTE — PROGRESS NOTES
Return NeuroOutpatient Note        Janice Rich  [id number]  14 y o   1950       MCI and Seizures        History obtained from:  Patient     HPI/Subjective:    Mr Monae is a 78 yo M with h/o seizure returns as follow up  He had presented to [facility] on Oct 16th 2017 with confusion  He woke up confused, very forgetful  He couldn't recall events from 2 days prior  His MRI brain was normal but his EEG had revealed right and left temporal lobe spikes and his event was thought to be from a seizure  He was placed on Keppra 500mg bid and is taking it without any difficulty  He denies any seizure like activity since the hospital admission  He's currently doing well  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  He has been reporting short term memory problems  We had ordered neuro psych testing at last visit and visit prior but he still hasn't gotten around to it  His MRI brain appeared stable  There was no significant atrophy  His labs were wnl  He did forget to pay taxes because said it was busy time for him  He has one sister with h/o epilepsy since childhood       Past Medical History:   Diagnosis Date    New onset seizure [facility])      Social History     Socioeconomic History    Marital status: Single     Spouse name: Not on file    Number of children: Not on file    Years of education: Not on file    Highest education level: Not on file   Occupational History    Not on file   Social Needs    Financial resource strain: Not on file    Food insecurity:     Worry: Not on file     Inability: Not on file    Transportation needs:     Medical: Not on file     Non-medical: Not on file   Tobacco Use    Smoking status: Never Smoker    Smokeless tobacco: Never Used   Substance and Sexual Activity    Alcohol use: No    Drug use: No    Sexual activity: Not Currently   Lifestyle    Physical activity:     Days per week: Not on file     Minutes per session: Not on file    Stress: Not on file Relationships    Social connections:     Talks on phone: Not on file     Gets together: Not on file     Attends Congregation service: Not on file     Active member of club or organization: Not on file     Attends meetings of clubs or organizations: Not on file     Relationship status: Not on file    Intimate partner violence:     Fear of current or ex partner: Not on file     Emotionally abused: Not on file     Physically abused: Not on file     Forced sexual activity: Not on file   Other Topics Concern    Not on file   Social History Narrative    Not on file     Family History   Problem Relation Age of Onset    No Known Problems Mother     No Known Problems Father     Multiple sclerosis Sister     Seizures Sister      No Known Allergies  Current Outpatient Medications on File Prior to Visit   Medication Sig Dispense Refill    tamsulosin (FLOMAX) 0 4 mg Take 1 capsule (0 4 mg total) by mouth daily with dinner (Patient taking differently: Take 0 8 mg by mouth daily with dinner ) 30 capsule 3    [DISCONTINUED] donepezil (ARICEPT) 10 mg tablet Take 1 tablet (10 mg total) by mouth daily at bedtime Starting Aug 10th  30 tablet 3    [DISCONTINUED] levETIRAcetam (KEPPRA) 500 mg tablet Take 1 tablet (500 mg total) by mouth every 12 (twelve) hours 180 tablet 1    [DISCONTINUED] donepezil (ARICEPT) 5 mg tablet Take 1 tablet (5 mg total) by mouth daily at bedtime 30 tablet 0    [DISCONTINUED] levETIRAcetam (KEPPRA) 500 mg tablet TAKE ONE TABLET TWICE DAILY AS DIRECTED 180 tablet 1     No current facility-administered medications on file prior to visit  Review of Systems   Refer to positive review of systems in HPI  Review of Systems   All other systems reviewed and are negative            Vitals:    01/09/20 1337   BP: 131/89   BP Location: Left arm   Patient Position: Sitting   Cuff Size: Adult   Pulse: 82   Weight: 86 2 kg (190 lb)   Height: 5' 10" (1 778 m)       PHYSICAL EXAM:  Appearance: No Acute Distress  Ophthalmoscopic: Disc Flat, Normal fundus  Mental status:  Orientation: Awake, Alert, and Orientedx3  Memory: Registation 3/3 Recall 2/3  MMSE: 25/30  Attention: normal  Knowledge: good  Language: No aphasia  Speech: No dysarthria  Cranial Nerves:  2 No Visual Defect on Confrontation, Pupils round, equal, reactive to light  3,4,6 Extraocular Movements Intact, no nystagmus  5 Facial Sensation Intact  7 No facial asymmetry  8 Intact hearing  9,10 Palate symmetric, normal gag  11 Good shoulder shrug  12 Tongue Midline  Gait: Stable  Coordination: No ataxia with finger to nose testing, and heel to shin  Sensory: Intact, Symmetric to pinprick, light touch, vibration, and joint position  Muscle Tone: Normal              Muscle exam:  Arm Right Left Leg Right Left   Deltoid 5/5 5/5 Iliopsoas 5/5 5/5   Biceps 5/5 5/5 Quads 5/5 5/5   Triceps 5/5 5/5 Hamstrings 5/5 5/5   Wrist Extension 5/5 5/5 Ankle Dorsi Flexion 5/5 5/5   Wrist Flexion 5/5 5/5 Ankle Plantar Flexion 5/5 5/5   Interossei 5/5 5/5 Ankle Eversion 5/5 5/5   APB 5/5 5/5 Ankle Inversion 5/5 5/5       Reflexes   RJ BJ TJ KJ AJ Plantars Coleman's   Right 2+ 2+ 2+ 2+ 2+ Downgoing Not present   Left 2+ 2+ 2+ 2+ 2+ Downgoing Not present     Personal review of  Labs:                    Diagnoses and all orders for this visit:        1  MCI (mild cognitive impairment)  Ambulatory referral to Neuropsychology    donepezil (ARICEPT) 10 mg tablet   2  History of seizure  EEG Awake and asleep   3  Abnormal EEG  EEG Awake and asleep    levETIRAcetam (KEPPRA) 500 mg tablet   4  Seizure ([address] )  levETIRAcetam (KEPPRA) 500 mg tablet       Patient has remained stable in terms of seizure activity  Will resume Keppra 500mg bid  We have not had f/u EEG since his initial evaluation in hospital  Would like to obtain one routine follow up to make sure we don't need to adjust dose of Keppra     Once again enncouraged getting neuropsych testing to have comprehensive evaluation as baseline  Patient does try to keep himself busy with word search, cross word puzzles to keep his mind sharp               Total time of encounter:  30 min  More than 50% of the time was used in counseling and/or coordination of care  Extent of counseling and/or coordination of care        MD Verenice [facility]  [address]  [address]  [phone]

## 2020-05-13 ENCOUNTER — TELEPHONE (OUTPATIENT)
Dept: NEUROLOGY | Facility: CLINIC | Age: 70
End: 2020-05-13

## 2020-05-29 ENCOUNTER — TELEPHONE (OUTPATIENT)
Dept: NEUROLOGY | Facility: CLINIC | Age: 70
End: 2020-05-29

## 2020-07-09 ENCOUNTER — TELEPHONE (OUTPATIENT)
Dept: NEUROLOGY | Facility: CLINIC | Age: 70
End: 2020-07-09

## 2020-07-09 ENCOUNTER — OFFICE VISIT (OUTPATIENT)
Dept: NEUROLOGY | Facility: CLINIC | Age: 70
End: 2020-07-09
Payer: COMMERCIAL

## 2020-07-09 VITALS
BODY MASS INDEX: 26.2 KG/M2 | HEART RATE: 69 BPM | TEMPERATURE: 98.6 F | WEIGHT: 183 LBS | SYSTOLIC BLOOD PRESSURE: 115 MMHG | DIASTOLIC BLOOD PRESSURE: 69 MMHG | HEIGHT: 70 IN

## 2020-07-09 DIAGNOSIS — R41.3 MEMORY IMPAIRMENT: ICD-10-CM

## 2020-07-09 DIAGNOSIS — G31.84 MCI (MILD COGNITIVE IMPAIRMENT): ICD-10-CM

## 2020-07-09 DIAGNOSIS — R94.01 ABNORMAL EEG: ICD-10-CM

## 2020-07-09 DIAGNOSIS — R56.9 SEIZURE (HCC): ICD-10-CM

## 2020-07-09 DIAGNOSIS — Z87.898 HISTORY OF SEIZURE: Primary | ICD-10-CM

## 2020-07-09 DIAGNOSIS — R94.01 EEG ABNORMAL: ICD-10-CM

## 2020-07-09 PROCEDURE — 99214 OFFICE O/P EST MOD 30 MIN: CPT | Performed by: PSYCHIATRY & NEUROLOGY

## 2020-07-09 PROCEDURE — 1160F RVW MEDS BY RX/DR IN RCRD: CPT | Performed by: PSYCHIATRY & NEUROLOGY

## 2020-07-09 PROCEDURE — 3074F SYST BP LT 130 MM HG: CPT | Performed by: PSYCHIATRY & NEUROLOGY

## 2020-07-09 PROCEDURE — 3008F BODY MASS INDEX DOCD: CPT | Performed by: PSYCHIATRY & NEUROLOGY

## 2020-07-09 PROCEDURE — 3078F DIAST BP <80 MM HG: CPT | Performed by: PSYCHIATRY & NEUROLOGY

## 2020-07-09 PROCEDURE — 4040F PNEUMOC VAC/ADMIN/RCVD: CPT | Performed by: PSYCHIATRY & NEUROLOGY

## 2020-07-09 PROCEDURE — 1036F TOBACCO NON-USER: CPT | Performed by: PSYCHIATRY & NEUROLOGY

## 2020-07-09 RX ORDER — LEVETIRACETAM 500 MG/1
500 TABLET ORAL EVERY 12 HOURS SCHEDULED
Qty: 180 TABLET | Refills: 1 | Status: SHIPPED | OUTPATIENT
Start: 2020-07-09

## 2020-07-09 RX ORDER — DONEPEZIL HYDROCHLORIDE 10 MG/1
10 TABLET, FILM COATED ORAL
Qty: 90 TABLET | Refills: 1 | Status: SHIPPED | OUTPATIENT
Start: 2020-07-09 | End: 2020-11-27

## 2020-07-09 NOTE — PROGRESS NOTES
Return NeuroOutpatient Note        Liang Meier  [id number]  72 y o   1950       CC: h/o seizure, memory impairment       History obtained from:  Patient     HPI/Subjective:    Liang Meier is a 73 yo M with PMH of seizure presents as f/u  Per my previous history, he had presented to [facility] on Oct 16th 2017 with confusion  He woke up confused, very forgetful  He couldn't recall events from 2 days prior  His MRI brain was normal but his EEG had revealed right and left temporal lobe spikes and his event was thought to be from a seizure  He was placed on Keppra 500mg bid and has been compliant with it  He has not had any seizure activity since initial hospitalization  We had ordered repeat EEG in Jan but he hasn't done it yet  He says he may have forgotten  Previously, he has been reporting short term memory problems  We had ordered neuro psych testing but says it was scheduled in June and now postpones by them until Nov    His MRI brain appeared stable  There was no significant atrophy  His labs were wnl         He has one sister with h/o epilepsy since childhood         Past Medical History:   Diagnosis Date    New onset seizure [facility])      Social History     Socioeconomic History    Marital status: Single     Spouse name: Not on file    Number of children: Not on file    Years of education: Not on file    Highest education level: Not on file   Occupational History    Not on file   Social Needs    Financial resource strain: Not on file    Food insecurity:     Worry: Not on file     Inability: Not on file    Transportation needs:     Medical: Not on file     Non-medical: Not on file   Tobacco Use    Smoking status: Never Smoker    Smokeless tobacco: Never Used   Substance and Sexual Activity    Alcohol use: No    Drug use: No    Sexual activity: Not Currently   Lifestyle    Physical activity:     Days per week: Not on file     Minutes per session: Not on file    Stress: Not on file Relationships    Social connections:     Talks on phone: Not on file     Gets together: Not on file     Attends Christianity service: Not on file     Active member of club or organization: Not on file     Attends meetings of clubs or organizations: Not on file     Relationship status: Not on file    Intimate partner violence:     Fear of current or ex partner: Not on file     Emotionally abused: Not on file     Physically abused: Not on file     Forced sexual activity: Not on file   Other Topics Concern    Not on file   Social History Narrative    Not on file     Family History   Problem Relation Age of Onset    No Known Problems Mother     No Known Problems Father     Multiple sclerosis Sister     Seizures Sister      No Known Allergies  Current Outpatient Medications on File Prior to Visit   Medication Sig Dispense Refill    tamsulosin (FLOMAX) 0 4 mg Take 1 capsule (0 4 mg total) by mouth daily with dinner (Patient taking differently: Take 0 8 mg by mouth daily with dinner ) 30 capsule 3    [DISCONTINUED] donepezil (ARICEPT) 10 mg tablet Take 1 tablet (10 mg total) by mouth daily at bedtime 90 tablet 1    [DISCONTINUED] levETIRAcetam (KEPPRA) 500 mg tablet Take 1 tablet (500 mg total) by mouth every 12 (twelve) hours 180 tablet 1     No current facility-administered medications on file prior to visit  Review of Systems   Refer to positive review of systems in HPI     Review of Systems    Constitutional- No fever  Eyes- No visual change  ENT- Hearing normal  CV- No chest pain  Resp- No Shortness of breath  GI- No diarrhea  - Bladder normal  MS- No Arthritis   Skin- No rash  Psych- No depression  Endo- No DM  Heme- No nodes    Vitals:    07/09/20 0910   BP: 115/69   BP Location: Right arm   Patient Position: Sitting   Cuff Size: Adult   Pulse: 69   Temp: 98 6 °F (37 °C)   Weight: 83 kg (183 lb)   Height: 5' 10" (1 778 m)       PHYSICAL EXAM:  Appearance: No Acute Distress  Ophthalmoscopic: Disc Flat, Normal fundus  Mental status:  Orientation: Awake, Alert, and Orientedx3  Memory: Registation 3/3 Recall 2/3  At last visit, he had scored 25/30 on MOCA  Attention: normal  Knowledge: good  Language: No aphasia  Speech: No dysarthria  Cranial Nerves:  2 No Visual Defect on Confrontation, Pupils round, equal, reactive to light  3,4,6 Extraocular Movements Intact, no nystagmus  5 Facial Sensation Intact  7 No facial asymmetry  8 Intact hearing  9,10 Palate symmetric, normal gag  11 Good shoulder shrug  12 Tongue Midline  Gait: Stable  Coordination: No ataxia with finger to nose testing, and heel to shin  Sensory: Intact, Symmetric to pinprick, light touch, vibration, and joint position  Muscle Tone: Normal              Muscle exam:  Arm Right Left Leg Right Left   Deltoid 5/5 5/5 Iliopsoas 5/5 5/5   Biceps 5/5 5/5 Quads 5/5 5/5   Triceps 5/5 5/5 Hamstrings 5/5 5/5   Wrist Extension 5/5 5/5 Ankle Dorsi Flexion 5/5 5/5   Wrist Flexion 5/5 5/5 Ankle Plantar Flexion 5/5 5/5   Interossei 5/5 5/5 Ankle Eversion 5/5 5/5   APB 5/5 5/5 Ankle Inversion 5/5 5/5       Reflexes   RJ BJ TJ KJ AJ Plantars Coleman's   Right 2+ 2+ 2+ 2+ 2+ Downgoing Not present   Left 2+ 2+ 2+ 2+ 2+ Downgoing Not present     Personal review of  Labs:                    Diagnoses and all orders for this visit:        1  History of seizure  EEG Awake and asleep   2  EEG abnormal  EEG Awake and asleep   3  Memory impairment  Ambulatory referral to Neuropsychology   4  MCI (mild cognitive impairment)  donepezil (ARICEPT) 10 mg tablet   5  Seizure (HCC)  levETIRAcetam (KEPPRA) 500 mg tablet   6  Abnormal EEG  levETIRAcetam (KEPPRA) 500 mg tablet       Patient is stable from seizure stand point  Would like to repeat EEG once to see if it's still abnormal or not  If normal, may consider reducing dose  Will refer him to a different site for neuropsych testing as now is the time to have it completed  Nov may be worse in terms of Covid 19  Total time of encounter:  30 min  More than 50% of the time was used in counseling and/or coordination of care  Extent of counseling and/or coordination of care        Amarjit Lopez MD  [address] [place] [address] [facility]  [address]  [address] [place] [address]  [phone]

## 2020-07-09 NOTE — TELEPHONE ENCOUNTER
Prescription, office note, and MRI faxed to Dr Milly Rizzo at [phone]    [facility] appt scheduled for 10/14/20 at 9:00 am at [address] [facility], Shannan Coleman, [address] [place] [address]

## 2020-07-09 NOTE — TELEPHONE ENCOUNTER
Pt appt cancelled by Gearldine Baumgarten @ Dr Guzman Block office   Would like to get him scheduled elsewhere for a sooner date being have nothing available before his scheduled date in Nov

## 2020-08-13 ENCOUNTER — HOSPITAL ENCOUNTER (OUTPATIENT)
Dept: NEUROLOGY | Facility: HOSPITAL | Age: 70
Discharge: HOME | End: 2020-08-13
Attending: PSYCHIATRY & NEUROLOGY
Payer: COMMERCIAL

## 2020-08-13 ENCOUNTER — TRANSCRIBE ORDERS (OUTPATIENT)
Dept: ADMINISTRATIVE | Facility: HOSPITAL | Age: 70
End: 2020-08-13

## 2020-08-13 DIAGNOSIS — R94.01 EEG ABNORMAL: ICD-10-CM

## 2020-08-13 DIAGNOSIS — Z87.898 HISTORY OF SEIZURE: ICD-10-CM

## 2020-08-13 PROCEDURE — 95816 EEG AWAKE AND DROWSY: CPT | Performed by: PSYCHIATRY & NEUROLOGY

## 2020-08-13 PROCEDURE — 95819 EEG AWAKE AND ASLEEP: CPT

## 2020-11-27 DIAGNOSIS — G31.84 MCI (MILD COGNITIVE IMPAIRMENT): ICD-10-CM

## 2020-11-27 RX ORDER — DONEPEZIL HYDROCHLORIDE 10 MG/1
10 TABLET, FILM COATED ORAL
Qty: 90 TABLET | Refills: 1 | Status: SHIPPED | OUTPATIENT
Start: 2020-11-27 | End: 2022-05-25 | Stop reason: SDUPTHER

## 2020-12-31 ENCOUNTER — TELEPHONE (OUTPATIENT)
Dept: NEUROLOGY | Facility: CLINIC | Age: 70
End: 2020-12-31

## 2021-01-04 ENCOUNTER — TELEPHONE (OUTPATIENT)
Dept: NEUROLOGY | Facility: CLINIC | Age: 71
End: 2021-01-04

## 2021-01-06 ENCOUNTER — OFFICE VISIT (OUTPATIENT)
Dept: NEUROLOGY | Facility: CLINIC | Age: 71
End: 2021-01-06

## 2021-01-06 VITALS
HEART RATE: 73 BPM | WEIGHT: 188 LBS | BODY MASS INDEX: 26.92 KG/M2 | HEIGHT: 70 IN | SYSTOLIC BLOOD PRESSURE: 117 MMHG | TEMPERATURE: 97.2 F | DIASTOLIC BLOOD PRESSURE: 74 MMHG

## 2021-01-06 DIAGNOSIS — R41.840 ATTENTION DEFICIT: ICD-10-CM

## 2021-01-06 DIAGNOSIS — G31.84 MCI (MILD COGNITIVE IMPAIRMENT): ICD-10-CM

## 2021-01-06 DIAGNOSIS — Z87.898 HISTORY OF SEIZURE: Primary | ICD-10-CM

## 2021-01-06 PROCEDURE — 99214 OFFICE O/P EST MOD 30 MIN: CPT | Performed by: PSYCHIATRY & NEUROLOGY

## 2021-01-06 RX ORDER — LEVETIRACETAM 500 MG/1
500 TABLET, EXTENDED RELEASE ORAL DAILY
Qty: 30 TABLET | Refills: 3 | Status: SHIPPED | OUTPATIENT
Start: 2021-01-06 | End: 2021-01-06 | Stop reason: CLARIF

## 2021-01-06 RX ORDER — LEVETIRACETAM 500 MG/1
500 TABLET, EXTENDED RELEASE ORAL DAILY
Qty: 30 TABLET | Refills: 5 | Status: SHIPPED | OUTPATIENT
Start: 2021-01-06 | End: 2022-03-04 | Stop reason: SDUPTHER

## 2021-01-06 NOTE — PATIENT INSTRUCTIONS
Take 1 full tablet at bedtime and 1/2 tablet keppra in morning for 2 weeks  Then, take 1 tab of new prescription XR form once daily

## 2021-07-01 ENCOUNTER — TELEPHONE (OUTPATIENT)
Dept: NEUROLOGY | Facility: CLINIC | Age: 71
End: 2021-07-01

## 2021-07-02 ENCOUNTER — TELEPHONE (OUTPATIENT)
Dept: NEUROLOGY | Facility: CLINIC | Age: 71
End: 2021-07-02

## 2021-07-02 NOTE — TELEPHONE ENCOUNTER
THE [facility] for patient to [facility] and confirm appointment with Dr Elisabet Payne  Gave direct numbers in [place]

## 2021-12-13 ENCOUNTER — VBI (OUTPATIENT)
Dept: ADMINISTRATIVE | Facility: OTHER | Age: 71
End: 2021-12-13

## 2022-03-04 DIAGNOSIS — Z87.898 HISTORY OF SEIZURE: ICD-10-CM

## 2022-03-04 RX ORDER — LEVETIRACETAM 500 MG/1
500 TABLET, EXTENDED RELEASE ORAL DAILY
Qty: 90 TABLET | Refills: 1 | Status: SHIPPED | OUTPATIENT
Start: 2022-03-04

## 2022-05-17 ENCOUNTER — TELEPHONE (OUTPATIENT)
Dept: NEUROLOGY | Facility: CLINIC | Age: 72
End: 2022-05-17

## 2022-05-25 ENCOUNTER — OFFICE VISIT (OUTPATIENT)
Dept: NEUROLOGY | Facility: CLINIC | Age: 72
End: 2022-05-25
Payer: COMMERCIAL

## 2022-05-25 VITALS
HEART RATE: 76 BPM | TEMPERATURE: 98.3 F | SYSTOLIC BLOOD PRESSURE: 146 MMHG | WEIGHT: 189 LBS | HEIGHT: 70 IN | DIASTOLIC BLOOD PRESSURE: 83 MMHG | BODY MASS INDEX: 27.06 KG/M2

## 2022-05-25 DIAGNOSIS — R56.9 CONVULSIONS, UNSPECIFIED CONVULSION TYPE (HCC): ICD-10-CM

## 2022-05-25 DIAGNOSIS — Z87.898 HISTORY OF SEIZURE: ICD-10-CM

## 2022-05-25 DIAGNOSIS — G31.84 MCI (MILD COGNITIVE IMPAIRMENT): Primary | ICD-10-CM

## 2022-05-25 PROCEDURE — 99214 OFFICE O/P EST MOD 30 MIN: CPT | Performed by: PSYCHIATRY & NEUROLOGY

## 2022-05-25 RX ORDER — DONEPEZIL HYDROCHLORIDE 10 MG/1
10 TABLET, FILM COATED ORAL
Qty: 30 TABLET | Refills: 5 | Status: SHIPPED | OUTPATIENT
Start: 2022-05-25
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
## 2022-05-25 NOTE — PATIENT INSTRUCTIONS
Start taking Donepezil (Aricept) 10mg once daily  This is for your memory  Take Levetirecetam (Keppra) every other day for next month and then stop it

## 2022-11-23 ENCOUNTER — TELEPHONE (OUTPATIENT)
Dept: NEUROLOGY | Facility: CLINIC | Age: 72
End: 2022-11-23

## 2022-11-30 ENCOUNTER — OFFICE VISIT (OUTPATIENT)
Dept: NEUROLOGY | Facility: CLINIC | Age: 72
End: 2022-11-30

## 2022-11-30 VITALS
DIASTOLIC BLOOD PRESSURE: 80 MMHG | SYSTOLIC BLOOD PRESSURE: 124 MMHG | BODY MASS INDEX: 27.49 KG/M2 | HEIGHT: 70 IN | WEIGHT: 192 LBS | HEART RATE: 91 BPM | TEMPERATURE: 96.1 F

## 2022-11-30 DIAGNOSIS — G31.84 MCI (MILD COGNITIVE IMPAIRMENT): Primary | ICD-10-CM

## 2022-11-30 DIAGNOSIS — Z87.898 H/O IDIOPATHIC SEIZURE: ICD-10-CM

## 2022-11-30 RX ORDER — MEMANTINE HYDROCHLORIDE 10 MG/1
10 TABLET ORAL DAILY
Qty: 30 TABLET | Refills: 5 | Status: SHIPPED | OUTPATIENT
Start: 2022-11-30

## 2022-11-30 RX ORDER — MEMANTINE HYDROCHLORIDE 5 MG/1
5 TABLET ORAL DAILY
Qty: 30 TABLET | Refills: 0 | Status: SHIPPED | OUTPATIENT
Start: 2022-11-30
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
## 2022-12-02 ENCOUNTER — VBI (OUTPATIENT)
Dept: ADMINISTRATIVE | Facility: OTHER | Age: 72
End: 2022-12-02

## 2022-12-02 NOTE — TELEPHONE ENCOUNTER
12/02/22 3:14 PM     VB [employer] SmartForm used to document caregap status      Familia Calloway, Jenifer Vision [place] Ally

## 2022-12-12 ENCOUNTER — OFFICE VISIT (OUTPATIENT)
Dept: OCCUPATIONAL THERAPY | Facility: CLINIC | Age: 72
End: 2022-12-12

## 2022-12-12 DIAGNOSIS — G31.84 MCI (MILD COGNITIVE IMPAIRMENT): Primary | ICD-10-CM
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
## 2022-12-23 DIAGNOSIS — G31.84 MCI (MILD COGNITIVE IMPAIRMENT): ICD-10-CM

## 2022-12-23 RX ORDER — MEMANTINE HYDROCHLORIDE 5 MG/1
5 TABLET ORAL DAILY
Qty: 30 TABLET | Refills: 0 | Status: SHIPPED | OUTPATIENT
Start: 2022-12-23

## 2023-01-11 ENCOUNTER — TELEPHONE (OUTPATIENT)
Dept: NEUROLOGY | Facility: CLINIC | Age: 73
End: 2023-01-11

## 2023-01-11 NOTE — TELEPHONE ENCOUNTER
Pt called to let Dr Katie Singh know that insurance denied his MRI that is to be done this Friday 1/13  Pt wants to know what Dr Katie Singh recommends since is wont be done     Please assist

## 2023-01-11 NOTE — TELEPHONE ENCOUNTER
Diana Bazan MD  to Me      3:26 PM  Can you see why did it get denied? It was for memory and for his age and abnormal moca score, it has to be approved

## 2023-01-11 NOTE — TELEPHONE ENCOUNTER
Type Date User Summary Attachment   General 01/11/2023 10:48 AM Lizzie Joseph - -   Note    Received a call from Lj Durant  He said he called his insurance and that they would pay for the test however he would have a copay of $200        I let him know that the test was denied  The labs were normal and they would not approved unless the labs were abnormal       I then told him if he had any questions to call Dr Chuck Singletary office to see if she would want to order another test since the MRI brain was denied      He did have a hard time understanding what I tried to explain  I did give him the # for the office      I followed up with a call to  to cancel appt for patient  I then contacted [place] @   Dr Chuck Singletary office to make them aware that Lj Durant may call  Dr Nehemias Gonzalez - Please see referral notes from pre-encounter of why MRI denied

## 2023-01-12 NOTE — TELEPHONE ENCOUNTER
Aguilar Esquivel MD  to Me      5:28 PM  So it looks like it was approved  He just has co pay of $200  We can't help with that     He does need it but he has to be ok with co pay if not, we just document and cancel the test

## 2023-01-12 NOTE — TELEPHONE ENCOUNTER
Pt called in still confused about the MRI  He was asking if it was still scheduled  If someone could call him back he would appreciate it  I did let him know it was cancelled

## 2023-01-13 ENCOUNTER — TELEPHONE (OUTPATIENT)
Dept: NEUROLOGY | Facility: CLINIC | Age: 73
End: 2023-01-13

## 2023-01-13 NOTE — TELEPHONE ENCOUNTER
After I received the message from [facility] @ Team 6  I called [facility] and spoke with Nurse  Angélica Asencio  She explained that once Medicare recipients are denied, a P2P is not designated to be competed through [facility] for this patients plan  If a P2P is completed with [facility] it is not considered a P2P but a [employer] only and no changes to the case will be made  It will only put notes in the file  She directed me to contact the [facility] plan  Ref # for call is case # [id number]  I called [facility] and spoke with Zeke Fowler  in the Pre Cert Dept  She said patient has limited benefits plan (HMO) and both Dr Gerard and [facility] are [address]  This case denied out because both are OON  If the provider and site were in network with his plan it would require a pre cert  Her suggestion was to speak with someone in the benefits dept to determine where the patient can have test completed  Ref # for call # [id number] with Lolis CUMMINGS     I was then transferred to [facility] @ [facility] benefits dept  She gave me a list of 2 facilities In the area that are considered in network   1  Image care of [address] [place] [address] ([phone])  2  [facility] located in [place] ([phone])    She then transferred me to begin a new pre cert with an updated in network site  Ref # for call [id number] with Jhonatan Campbell     I was transferred to [address] [place] [address]  She indicated that if the site was OON we could make an update to the site and possible they could overturn the denial  We decided to call [facility] together  We were conferenced into [facility] and spoke with a rep  Tony Vidalest  She indicated that the test was not only denied for the site, but the medical information they received did not support the requirements to approve  (They are looking for recent labs, B12 etc)  Also they would like documentation from family  They want to see if Angeline Lux is showing declining cognitively on a daily basis   They also are requiring the [facility] which I did provide to them  Since this case has been denied we would need to begin a reconsideration/appeal with [facility] Tellez informed me I would need to speak with someone in the benefits dept @ [facility] to begin the process  (no ref # provided since it was a warm transfer)    She transferred me to [place] EMILIANO in the benefits dept  Upon checking he indicated that an appeal could be faxed or mailed  Fax # for appeal dept  [phone]  Call ref # for call with [place] # [id number]    He did also say P2P was an option with [facility] # provided is [phone])    [facility] did transfer me to the P2P line and the nurse that answered and immediatlely  transferred me to [facility]  I will attempt to reach out to the P2P dept myself and see if they can arrange one  I received a message from Nola Freeman  Team 6 @ 7:46 am and I started working on the case immediately  Total Time spent on this call alone was 1 hr and 28 minutes  I also started working on this case on 01/09/2023  Placing several calls etc   See messages in referral     [address] Dept

## 2023-03-24 ENCOUNTER — TELEPHONE (OUTPATIENT)
Dept: NEUROLOGY | Facility: CLINIC | Age: 73
End: 2023-03-24

## 2023-03-24 DIAGNOSIS — G40.909 SEIZURE DISORDER (HCC): Primary | ICD-10-CM

## 2023-03-24 RX ORDER — LEVETIRACETAM 500 MG/1
1000 TABLET, EXTENDED RELEASE ORAL
Qty: 60 TABLET | Refills: 3 | Status: SHIPPED | OUTPATIENT
Start: 2023-03-24

## 2023-03-24 NOTE — TELEPHONE ENCOUNTER
Spoke with pt and advised him of Dr Katya Blum response and recommendations  Pt verbalizes understanding  Advised pt that the MRI was denied in the past, [address]  But our pre-cert team looked into other facilities  See encounter 1/13/2023  Provided pt with phone numbers to [facility] in [facility] and [facility]  Pt to call and see if they take his insurance

## 2023-03-24 NOTE — TELEPHONE ENCOUNTER
Patient called and stated that he felt as though he had a stroke last night      Please call the patient at [phone]

## 2023-03-24 NOTE — TELEPHONE ENCOUNTER
Spoke with pt and he states that he did hit his head and sustained injuries  He got a gash over his left eye, didn't effect his sight  Has bruising in his eye as well  Has a sore shoulder  No facial numbness  No difficulty speaking or swallowing  No weakness, sore at the moment  No numbness and tingling  Fall happened at around 2:30, still daylight  Doesn't remember falling, doesn't remember getting up from fall, doesn't remember sitting in chair and falling asleep  Pt says when he awoke, he was confused  He didn't realize what had happened  After he came to, he realized he had wet his pants  Not sure how long he was asleep, maybe woke up at 3pm or an hour later but also can't recall exactly what time he fell  Pt has hx of sz's  Not taking medication currently for sz  Pt has order for MRI brain NeuroQuant but has not yet scheduled this procedure  Dr Larissa Murillo - Please advise  [place]  [phone]  Pt says he will have his phone and will answer it

## 2023-03-24 NOTE — TELEPHONE ENCOUNTER
Aguilar Esquivel MD  to Me      1:31 PM   He was tapered off of keppra  Will need to restart it  If he feels fine now I can send script to his pharmacy  If not better, then ask him to go to ER

## 2023-03-30 NOTE — TELEPHONE ENCOUNTER
Pt friend, Bonnie Johnson called pt was by her side  Pt is going to have MRI in [facility] in [place]  She didn't know the fax number   But if you have any questions Bonnie Johnson can be reached at [phone]

## 2023-03-30 NOTE — TELEPHONE ENCOUNTER
LVM for pt to call back and let us know which imaging center he is interested in using so that we can fax MRI referral to them  Asked him to provide fax number as well

## 2023-03-30 NOTE — TELEPHONE ENCOUNTER
Patient calling to find out what the next steps are for this MRI or MRI referral   Please call patient back

## 2023-03-30 NOTE — TELEPHONE ENCOUNTER
See encounter 1/56, per pre-cert team:    I called [facility] and spoke with Jakub Mares  in the Pre Cert Dept  She said patient has limited benefits plan (HMO) and both Dr Gerard and [facility] are [address] Vas[address]  This case denied out because both are OON  If the provider and site were in network with his plan it would require a pre Mingo Haney suggestion was to speak with someone in the benefits dept to determine where the patient can have test completed  [facility] benefits dept  She gave me a list of 2 facilities In the area that are considered in network   1  Image care of [address] [place] [address] ([phone])  2  [facility] located in [place] ([phone])    Spoke with pt and his friend Korin Desai and informed them of above  Advised them to decide which image center they would like to use and call back with a fax number so we can fax referral  Deric starks and will assist pt in following up with scheduling MRI  Awaiting call back

## 2023-03-30 NOTE — TELEPHONE ENCOUNTER
Patient and friend called back with fax number for script  It is for [facility] in [facility] at [phone]

## 2023-04-11 NOTE — PROGRESS NOTES
[facility] Return NeuroOutpatient Note        Chalo Patton  [id number]  72 y o   1950       CC: h/o seizure, MCI      History obtained from:  Patient     HPI/Subjective:    Chalo Patton  Is a 73 yo M with PMH of seizure presents as f/u  Per my previous history, he had presented to [facility] on Oct 16th 2017 with confusion  He woke up confused, very forgetful  He couldn't recall events from 2 days prior  His MRI brain was normal but his EEG had revealed right and left temporal lobe spikes and his event was thought to be from a seizure  He was placed on Keppra 500mg bid and has been compliant with it  He has not had any seizure activity since initial hospitalization  We had ordered repeat EEG which was read as normal though stage 2 sleep was not recorded        Previously, he had been reporting short term memory problems  We had ordered neuro psych testing which he finally completed in Oct of 2020  This revealed deficit in attention, working memory, expressive language, visual and verbal memory  Patient had admitted to problem with attention, concentration since when he was young  Since his deficits were not impacting his daily life style, recommendation was to do exercises to strengthen them  His MRI brain appeared stable  There was no significant atrophy   His labs were wnl          He has one sister with h/o epilepsy since childhood        Past Medical History:   Diagnosis Date    New onset seizure ([facility] [address] )      Social History     Socioeconomic History    Marital status: Single     Spouse name: Not on file    Number of children: Not on file    Years of education: Not on file    Highest education level: Not on file   Occupational History    Not on file   Social Needs    Financial resource strain: Not on file    Food insecurity     Worry: Not on file     Inability: Not on file    Transportation needs     Medical: Not on file     Non-medical: Not on file   Tobacco Use    Smoking status: Never Smoker    Smokeless tobacco: Never Used   Substance and Sexual Activity    Alcohol use: No    Drug use: No    Sexual activity: Not Currently   Lifestyle    Physical activity     Days per week: Not on file     Minutes per session: Not on file    Stress: Not on file   Relationships    Social connections     Talks on phone: Not on file     Gets together: Not on file     Attends Yarsanism service: Not on file     Active member of club or organization: Not on file     Attends meetings of clubs or organizations: Not on file     Relationship status: Not on file    Intimate partner violence     Fear of current or ex partner: Not on file     Emotionally abused: Not on file     Physically abused: Not on file     Forced sexual activity: Not on file   Other Topics Concern    Not on file   Social History Narrative    Not on file     Family History   Problem Relation Age of Onset    No Known Problems Mother     No Known Problems Father     Multiple sclerosis Sister     Seizures Sister      No Known Allergies  Current Outpatient Medications on File Prior to Visit   Medication Sig Dispense Refill    donepezil (ARICEPT) 10 mg tablet TAKE 1 TABLET (10 MG TOTAL) BY MOUTH DAILY AT BEDTIME 90 tablet 1    levETIRAcetam (KEPPRA) 500 mg tablet Take 1 tablet (500 mg total) by mouth every 12 (twelve) hours 180 tablet 1    tamsulosin (FLOMAX) 0 4 mg Take 1 capsule (0 4 mg total) by mouth daily with dinner (Patient taking differently: Take 0 8 mg by mouth daily with dinner ) 30 capsule 3     No current facility-administered medications on file prior to visit  Review of Systems   Refer to positive review of systems in HPI     Review of Systems    Constitutional- No fever  Eyes- No visual change  ENT- Hearing normal  CV- No chest pain  Resp- No Shortness of breath  GI- No diarrhea  - Bladder normal  MS- No Arthritis   Skin- No rash  Psych- No depression  Endo- No DM  Heme- No nodes    Vitals:    01/06/21 0951   BP: 117/74   BP Location: Right arm   Patient Position: Sitting   Cuff Size: Adult   Pulse: 73   Temp: (!) 97 2 °F (36 2 °C)   Weight: 85 3 kg (188 lb)   Height: 5' 10" (1 778 m)       PHYSICAL EXAM:  Appearance: No Acute Distress  Ophthalmoscopic: Disc Flat, Normal fundus  Mental status:  Orientation: Awake, Alert, and Orientedx3  Memory: Registation 3/3 Recall 3/3  Attention: normal  Knowledge: good  Language: No aphasia  Speech: No dysarthria  Cranial Nerves:  2 No Visual Defect on Confrontation, Pupils round, equal, reactive to light  3,4,6 Extraocular Movements Intact, no nystagmus  5 Facial Sensation Intact  7 No facial asymmetry  8 Intact hearing  9,10 Palate symmetric, normal gag  11 Good shoulder shrug  12 Tongue Midline  Gait: Stable  Coordination: No ataxia with finger to nose testing, and heel to shin  Sensory: Intact, Symmetric to pinprick, light touch, vibration, and joint position  Muscle Tone: Normal              Muscle exam:  Arm Right Left Leg Right Left   Deltoid 5/5 5/5 Iliopsoas 5/5 5/5   Biceps 5/5 5/5 Quads 5/5 5/5   Triceps 5/5 5/5 Hamstrings 5/5 5/5   Wrist Extension 5/5 5/5 Ankle Dorsi Flexion 5/5 5/5   Wrist Flexion 5/5 5/5 Ankle Plantar Flexion 5/5 5/5   Interossei 5/5 5/5 Ankle Eversion 5/5 5/5   APB 5/5 5/5 Ankle Inversion 5/5 5/5       Reflexes   RJ BJ TJ KJ AJ Plantars Coleman's   Right 2+ 2+ 2+ 2+ 2+ Downgoing Not present   Left 2+ 2+ 2+ 2+ 2+ Downgoing Not present     Personal review of  Labs:                  Diagnoses and all orders for this visit:      1  History of seizure  levETIRAcetam (KEPPRA XR) 500 MG 24 hr tablet    DISCONTINUED: levETIRAcetam (KEPPRA XR) 500 MG 24 hr tablet   2  MCI (mild cognitive impairment)     3  Attention deficit           Since patient has remained stable in terms of seizures  Since last EEG was normal, will try to very slowly taper him off of AED if possible  Asked him to  Take 750mg daily for 2 weeks and then XR 500mg once daily after that     Discussed trying various brain exercises to keep his memory sharp  Patient is limited in terms of resources as he doesn't have computer, smart phone  He says he will try to get puzzles and word finding exercise books               Total time of encounter:  30 min  More than 50% of the time was used in counseling and/or coordination of care  Extent of counseling and/or coordination of care        Fauzia Carey MD  [facility]  [address]  [address]  [phone]

## 2023-05-22 ENCOUNTER — TELEPHONE (OUTPATIENT)
Dept: NEUROLOGY | Facility: CLINIC | Age: 73
End: 2023-05-22

## 2023-06-05 ENCOUNTER — OFFICE VISIT (OUTPATIENT)
Dept: NEUROLOGY | Facility: CLINIC | Age: 73
End: 2023-06-05
Payer: COMMERCIAL

## 2023-06-05 VITALS
OXYGEN SATURATION: 97 % | HEIGHT: 70 IN | DIASTOLIC BLOOD PRESSURE: 66 MMHG | HEART RATE: 81 BPM | WEIGHT: 180 LBS | BODY MASS INDEX: 25.77 KG/M2 | SYSTOLIC BLOOD PRESSURE: 102 MMHG | TEMPERATURE: 96.5 F

## 2023-06-05 DIAGNOSIS — Z87.898 HISTORY OF SEIZURE: ICD-10-CM

## 2023-06-05 DIAGNOSIS — G31.84 MCI (MILD COGNITIVE IMPAIRMENT): Primary | ICD-10-CM

## 2023-06-05 PROCEDURE — 99215 OFFICE O/P EST HI 40 MIN: CPT | Performed by: PSYCHIATRY & NEUROLOGY

## 2023-06-05 NOTE — PROGRESS NOTES
Return NeuroOutpatient Note        Mikey Meyers  [id number]  85 y o   1950       MCI (mild cognitive impairment) and History of  idiopathic seizure        History obtained from:  Patient     HPI/Subjective:    Mikey Meyers is a 67 yo M with PMH of MCI, seizure disorder presents as f/u  Per my previous history, he had presented to [facility] on Oct 16th 2017 with confusion  He woke up confused, very forgetful  He couldn't recall events from 2 days prior  His MRI brain was normal but his EEG had revealed right and left temporal lobe spikes and his event was thought to be from a seizure  He was placed on Keppra 500mg bid  Patient had been compliant with keppra until last visit       Patient was asked to have MRI brain neuroquant completed but he [facility] this done at [facility] who didn't do as ordered but did seizure protocol study in April of 2023  It showed small vessel disease, age appropriate atrophy and 2mm pituitary cyst  Otherwise it was ok  We had repeated EEG in 2020 and it was normal awake and drowsy  Tapering of keppra was considered but it's been resumed  Patient gets confused         He is supposed to take aricept but says after one dose, he couldn't sleep all night so stopped taking it  He remains on namenda 10mg daily         We had ordered neuro psych testing which he finally completed in Oct of 2020  This revealed deficit in attention, working memory, expressive language, visual and verbal memory  Patient had admitted to problem with attention, concentration since when he was young  Since his deficits were not impacting his daily life style, recommendation was to do exercises to strengthen them       His MRI brain appeared stable in 2019  There was no significant atrophy  His labs were wnl         He has one sister with h/o epilepsy since childhood      His MOCA at last visit was 23/30         Past Medical History:   Diagnosis Date   • MCI (mild cognitive impairment)    • New onset seizure ([facility] [address] ) Social History     Socioeconomic History   • Marital status: Single     Spouse name: Not on file   • Number of children: Not on file   • Years of education: Not on file   • Highest education level: Not on file   Occupational History   • Not on file   Tobacco Use   • Smoking status: Never   • Smokeless tobacco: Never   Vaping Use   • Vaping Use: Never used   Substance and Sexual Activity   • Alcohol use: No   • Drug use: No   • Sexual activity: Not Currently   Other Topics Concern   • Not on file   Social History Narrative   • Not on file     Social Determinants of Health     Financial Resource Strain: Not on file   Food Insecurity: Not on file   Transportation Needs: Not on file   Physical Activity: Not on file   Stress: Not on file   Social Connections: Not on file   Intimate Partner Violence: Not on file   Housing Stability: Not on file     Family History   Problem Relation Age of Onset   • No Known Problems Mother    • No Known Problems Father    • Multiple sclerosis Sister    • Seizures Sister      No Known Allergies  Current Outpatient Medications on File Prior to Visit   Medication Sig Dispense Refill   • levETIRAcetam (Keppra XR) 500 MG extended-release tablet Take 2 tablets (1,000 mg total) by mouth daily at bedtime 60 tablet 3   • memantine (Namenda) 10 mg tablet Take 1 tablet (10 mg total) by mouth daily Starting Dec 30th, 2022  30 tablet 5   • memantine (NAMENDA) 5 mg tablet TAKE 1 TABLET (5 MG TOTAL) BY MOUTH DAILY  (Patient not taking: Reported on 6/5/2023) 30 tablet 0   • tamsulosin (FLOMAX) 0 4 mg Take 1 capsule (0 4 mg total) by mouth daily with dinner (Patient not taking: Reported on 6/5/2023) 30 capsule 3     No current facility-administered medications on file prior to visit  Review of Systems   Refer to positive review of systems in HPI     Review of Systems    Constitutional- No fever  Eyes- No visual change  ENT- Hearing normal  CV- No chest pain  Resp- No Shortness of breath  GI- No "diarrhea  - Bladder normal  MS- No Arthritis   Skin- No rash  Psych- No depression  Endo- No DM  Heme- No nodes    Vitals:    06/05/23 1322   BP: 102/66   BP Location: Left arm   Patient Position: Sitting   Cuff Size: Standard   Pulse: 81   Temp: (!) 96 5 °F (35 8 °C)   TempSrc: Tympanic   SpO2: 97%   Weight: 81 6 kg (180 lb)   Height: 5' 10\" (1 778 m)       PHYSICAL EXAM:  Appearance: No Acute Distress  Ophthalmoscopic: Disc Flat, Normal fundus  Mental status:  Orientation: Awake, Alert, and Orientedx3  Memory: MOCA: 23/30  Attention: normal  Knowledge: good  Language: No aphasia  Speech: No dysarthria  Cranial Nerves:  2 No Visual Defect on Confrontation, Pupils round, equal, reactive to light  3,4,6 Extraocular Movements Intact, no nystagmus  5 Facial Sensation Intact  7 No facial asymmetry  8 Intact hearing  9,10 Palate symmetric, normal gag  11 Good shoulder shrug  12 Tongue Midline  Gait: Stable  Coordination: No ataxia with finger to nose testing, and heel to shin  Sensory: Intact, Symmetric to pinprick, light touch, vibration, and joint position  Muscle Tone: Normal              Muscle exam:  Arm Right Left Leg Right Left   Deltoid 5/5 5/5 Iliopsoas 5/5 5/5   Biceps 5/5 5/5 Quads 5/5 5/5   Triceps 5/5 5/5 Hamstrings 5/5 5/5   Wrist Extension 5/5 5/5 Ankle Dorsi Flexion 5/5 5/5   Wrist Flexion 5/5 5/5 Ankle Plantar Flexion 5/5 5/5   Interossei 5/5 5/5 Ankle Eversion 5/5 5/5   APB 5/5 5/5 Ankle Inversion 5/5 5/5       Reflexes   RJ BJ TJ KJ AJ Plantars Coleman's   Right 2+ 2+ 2+ 2+  Downgoing Not present   Left 2+ 2+ 2+ 2+  Downgoing Not present     Personal review of  Labs:                  Diagnoses and all orders for this visit:      1  MCI (mild cognitive impairment)        2  History of seizure            Patient has remained stable  There hasn't been any further seizure activity  His brother checks up on him and calls him daily  Will continue namenda 10mg daily and keppra 1000mg daily     In " future, we may need to order fitness to drive test for his memory  Today, his MOCA score compared to previous one is stable                 Total time of encounter:  40 min  More than 50% of the time was used in counseling and/or coordination of care  Extent of counseling and/or coordination of care        [facility]  [address]  [address]  [phone]

## 2023-06-22 ENCOUNTER — HOSPITAL ENCOUNTER (INPATIENT)
Facility: HOSPITAL | Age: 73
LOS: 1 days | Discharge: HOME | End: 2023-06-24
Attending: EMERGENCY MEDICINE | Admitting: INTERNAL MEDICINE
Payer: COMMERCIAL

## 2023-06-22 ENCOUNTER — APPOINTMENT (EMERGENCY)
Dept: CT IMAGING | Facility: HOSPITAL | Age: 73
End: 2023-06-22
Payer: COMMERCIAL

## 2023-06-22 DIAGNOSIS — R55 SYNCOPE: Primary | ICD-10-CM

## 2023-06-22 DIAGNOSIS — Z87.898 HISTORY OF SEIZURE: ICD-10-CM

## 2023-06-22 PROBLEM — R40.20 LOSS OF CONSCIOUSNESS (HCC): Status: ACTIVE | Noted: 2023-06-22
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
## 2023-06-22 PROBLEM — R01.1 CARDIAC MURMUR: Status: ACTIVE | Noted: 2023-06-22

## 2023-06-22 LAB
ALBUMIN SERPL BCP-MCNC: 4.3 G/DL (ref 3.5–5)
ALP SERPL-CCNC: 104 U/L (ref 34–104)
ALT SERPL W P-5'-P-CCNC: 24 U/L (ref 7–52)
ANION GAP SERPL CALCULATED.3IONS-SCNC: 4 MMOL/L
AST SERPL W P-5'-P-CCNC: 27 U/L (ref 13–39)
BASOPHILS # BLD AUTO: 0.04 THOUSANDS/ÂΜL (ref 0–0.1)
BASOPHILS NFR BLD AUTO: 1 % (ref 0–1)
BILIRUB SERPL-MCNC: 0.36 MG/DL (ref 0.2–1)
BUN SERPL-MCNC: 21 MG/DL (ref 5–25)
CALCIUM SERPL-MCNC: 9.2 MG/DL (ref 8.4–10.2)
CARDIAC TROPONIN I PNL SERPL HS: 2 NG/L (ref 8–18)
CHLORIDE SERPL-SCNC: 104 MMOL/L (ref 96–108)
CO2 SERPL-SCNC: 31 MMOL/L (ref 21–32)
CREAT SERPL-MCNC: 1.02 MG/DL (ref 0.6–1.3)
EOSINOPHIL # BLD AUTO: 0.08 THOUSAND/ÂΜL (ref 0–0.61)
EOSINOPHIL NFR BLD AUTO: 2 % (ref 0–6)
ERYTHROCYTE [DISTWIDTH] IN BLOOD BY AUTOMATED COUNT: 13.1 % (ref 11.6–15.1)
GFR SERPL CREATININE-BSD FRML MDRD: 73 ML/MIN/1.73SQ M
GLUCOSE SERPL-MCNC: 118 MG/DL (ref 65–140)
HCT VFR BLD AUTO: 40.3 % (ref 36.5–49.3)
HGB BLD-MCNC: 12.9 G/DL (ref 12–17)
IMM GRANULOCYTES # BLD AUTO: 0.01 THOUSAND/UL (ref 0–0.2)
IMM GRANULOCYTES NFR BLD AUTO: 0 % (ref 0–2)
LYMPHOCYTES # BLD AUTO: 1.14 THOUSANDS/ÂΜL (ref 0.6–4.47)
LYMPHOCYTES NFR BLD AUTO: 21 % (ref 14–44)
MCH RBC QN AUTO: 31.3 PG (ref 26.8–34.3)
MCHC RBC AUTO-ENTMCNC: 32 G/DL (ref 31.4–37.4)
MCV RBC AUTO: 98 FL (ref 82–98)
MONOCYTES # BLD AUTO: 0.52 THOUSAND/ÂΜL (ref 0.17–1.22)
MONOCYTES NFR BLD AUTO: 10 % (ref 4–12)
NEUTROPHILS # BLD AUTO: 3.71 THOUSANDS/ÂΜL (ref 1.85–7.62)
NEUTS SEG NFR BLD AUTO: 66 % (ref 43–75)
NRBC BLD AUTO-RTO: 0 /100 WBCS
PLATELET # BLD AUTO: 219 THOUSANDS/UL (ref 149–390)
PMV BLD AUTO: 9.4 FL (ref 8.9–12.7)
POTASSIUM SERPL-SCNC: 3.7 MMOL/L (ref 3.5–5.3)
PROT SERPL-MCNC: 7 G/DL (ref 6.4–8.4)
RBC # BLD AUTO: 4.12 MILLION/UL (ref 3.88–5.62)
SODIUM SERPL-SCNC: 139 MMOL/L (ref 135–147)
WBC # BLD AUTO: 5.5 THOUSAND/UL (ref 4.31–10.16)

## 2023-06-22 PROCEDURE — 36415 COLL VENOUS BLD VENIPUNCTURE: CPT | Performed by: EMERGENCY MEDICINE

## 2023-06-22 PROCEDURE — G1004 CDSM NDSC: HCPCS

## 2023-06-22 PROCEDURE — 80053 COMPREHEN METABOLIC PANEL: CPT | Performed by: EMERGENCY MEDICINE

## 2023-06-22 PROCEDURE — 99285 EMERGENCY DEPT VISIT HI MDM: CPT | Performed by: EMERGENCY MEDICINE

## 2023-06-22 PROCEDURE — 85025 COMPLETE CBC W/AUTO DIFF WBC: CPT | Performed by: EMERGENCY MEDICINE

## 2023-06-22 PROCEDURE — 99223 1ST HOSP IP/OBS HIGH 75: CPT | Performed by: INTERNAL MEDICINE

## 2023-06-22 PROCEDURE — 84484 ASSAY OF TROPONIN QUANT: CPT | Performed by: EMERGENCY MEDICINE

## 2023-06-22 PROCEDURE — 70450 CT HEAD/BRAIN W/O DYE: CPT

## 2023-06-22 PROCEDURE — 93005 ELECTROCARDIOGRAM TRACING: CPT

## 2023-06-22 PROCEDURE — 99284 EMERGENCY DEPT VISIT MOD MDM: CPT

## 2023-06-22 RX ORDER — MEMANTINE HYDROCHLORIDE 10 MG/1
10 TABLET ORAL DAILY
Status: DISCONTINUED | OUTPATIENT
Start: 2023-06-23 | End: 2023-06-24 | Stop reason: HOSPADM

## 2023-06-22 RX ORDER — ENOXAPARIN SODIUM 100 MG/ML
40 INJECTION SUBCUTANEOUS DAILY
Status: DISCONTINUED | OUTPATIENT
Start: 2023-06-23 | End: 2023-06-24 | Stop reason: HOSPADM

## 2023-06-22 RX ORDER — LEVETIRACETAM 500 MG/1
500 TABLET ORAL EVERY 12 HOURS SCHEDULED
Status: DISCONTINUED | OUTPATIENT
Start: 2023-06-22 | End: 2023-06-23

## 2023-06-22 RX ORDER — MAGNESIUM HYDROXIDE/ALUMINUM HYDROXICE/SIMETHICONE 120; 1200; 1200 MG/30ML; MG/30ML; MG/30ML
30 SUSPENSION ORAL EVERY 6 HOURS PRN
Status: DISCONTINUED | OUTPATIENT
Start: 2023-06-22 | End: 2023-06-24 | Stop reason: HOSPADM

## 2023-06-22 RX ORDER — POLYETHYLENE GLYCOL 3350 17 G/17G
17 POWDER, FOR SOLUTION ORAL DAILY PRN
Status: DISCONTINUED | OUTPATIENT
Start: 2023-06-22 | End: 2023-06-24 | Stop reason: HOSPADM

## 2023-06-22 RX ORDER — ONDANSETRON 2 MG/ML
4 INJECTION INTRAMUSCULAR; INTRAVENOUS EVERY 6 HOURS PRN
Status: DISCONTINUED | OUTPATIENT
Start: 2023-06-22 | End: 2023-06-24 | Stop reason: HOSPADM

## 2023-06-22 RX ORDER — LEVETIRACETAM 500 MG/1
1000 TABLET ORAL
Status: DISCONTINUED | OUTPATIENT
Start: 2023-06-22 | End: 2023-06-22

## 2023-06-22 RX ORDER — ACETAMINOPHEN 325 MG/1
650 TABLET ORAL EVERY 6 HOURS PRN
Status: DISCONTINUED | OUTPATIENT
Start: 2023-06-22 | End: 2023-06-24 | Stop reason: HOSPADM

## 2023-06-22 RX ADMIN — LEVETIRACETAM 500 MG: 500 TABLET, FILM COATED ORAL at 21:42
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
## 2023-06-23 ENCOUNTER — APPOINTMENT (INPATIENT)
Dept: CT IMAGING | Facility: HOSPITAL | Age: 73
End: 2023-06-23
Payer: COMMERCIAL

## 2023-06-23 ENCOUNTER — APPOINTMENT (OUTPATIENT)
Dept: NON INVASIVE DIAGNOSTICS | Facility: HOSPITAL | Age: 73
End: 2023-06-23
Payer: COMMERCIAL

## 2023-06-23 ENCOUNTER — APPOINTMENT (OUTPATIENT)
Dept: NEUROLOGY | Facility: HOSPITAL | Age: 73
End: 2023-06-23
Payer: COMMERCIAL

## 2023-06-23 LAB
ALBUMIN SERPL BCP-MCNC: 4 G/DL (ref 3.5–5)
ALP SERPL-CCNC: 99 U/L (ref 34–104)
ALT SERPL W P-5'-P-CCNC: 20 U/L (ref 7–52)
ANION GAP SERPL CALCULATED.3IONS-SCNC: 6 MMOL/L
AORTIC ROOT: 3.3 CM
APICAL FOUR CHAMBER EJECTION FRACTION: 71 %
ASCENDING AORTA: 3.8 CM
AST SERPL W P-5'-P-CCNC: 20 U/L (ref 13–39)
ATRIAL RATE: 91 BPM
AV REGURGITATION PRESSURE HALF TIME: 591 MS
BILIRUB SERPL-MCNC: 0.37 MG/DL (ref 0.2–1)
BUN SERPL-MCNC: 19 MG/DL (ref 5–25)
CALCIUM SERPL-MCNC: 8.8 MG/DL (ref 8.4–10.2)
CHLORIDE SERPL-SCNC: 103 MMOL/L (ref 96–108)
CO2 SERPL-SCNC: 30 MMOL/L (ref 21–32)
CREAT SERPL-MCNC: 1.09 MG/DL (ref 0.6–1.3)
E WAVE DECELERATION TIME: 272 MS
FRACTIONAL SHORTENING: 36 (ref 28–44)
GFR SERPL CREATININE-BSD FRML MDRD: 67 ML/MIN/1.73SQ M
GLUCOSE SERPL-MCNC: 138 MG/DL (ref 65–140)
INTERVENTRICULAR SEPTUM IN DIASTOLE (PARASTERNAL SHORT AXIS VIEW): 1 CM
INTERVENTRICULAR SEPTUM: 1 CM (ref 0.6–1.1)
LAAS-AP2: 17.2 CM2
LAAS-AP4: 20.1 CM2
LEFT ATRIUM SIZE: 3.8 CM
LEFT ATRIUM VOLUME (MOD BIPLANE): 54 ML
LEFT INTERNAL DIMENSION IN SYSTOLE: 2.9 CM (ref 2.1–4)
LEFT VENTRICULAR INTERNAL DIMENSION IN DIASTOLE: 4.5 CM (ref 3.5–6)
LEFT VENTRICULAR POSTERIOR WALL IN END DIASTOLE: 1 CM
LEFT VENTRICULAR STROKE VOLUME: 60 ML
LVSV (TEICH): 60 ML
MV E'TISSUE VEL-SEP: 7 CM/S
MV PEAK A VEL: 0.66 M/S
MV PEAK E VEL: 62 CM/S
MV STENOSIS PRESSURE HALF TIME: 79 MS
MV VALVE AREA P 1/2 METHOD: 2.78
P AXIS: 43 DEGREES
POTASSIUM SERPL-SCNC: 3.9 MMOL/L (ref 3.5–5.3)
PR INTERVAL: 160 MS
PROLACTIN SERPL-MCNC: 11.59 NG/ML
PROT SERPL-MCNC: 6.7 G/DL (ref 6.4–8.4)
QRS AXIS: 26 DEGREES
QRSD INTERVAL: 98 MS
QT INTERVAL: 380 MS
QTC INTERVAL: 467 MS
RA PRESSURE ESTIMATED: 5 MMHG
RIGHT ATRIUM AREA SYSTOLE A4C: 18.1 CM2
RIGHT VENTRICLE ID DIMENSION: 4.7 CM
RV PSP: 39 MMHG
SL CV AV DECELERATION TIME RETROGRADE: 2037 MS
SL CV AV PEAK GRADIENT RETROGRADE: 92 MMHG
SL CV LEFT ATRIUM LENGTH A2C: 5.3 CM
SL CV LV EF: 65
SL CV PED ECHO LEFT VENTRICLE DIASTOLIC VOLUME (MOD BIPLANE) 2D: 92 ML
SL CV PED ECHO LEFT VENTRICLE SYSTOLIC VOLUME (MOD BIPLANE) 2D: 32 ML
SODIUM SERPL-SCNC: 139 MMOL/L (ref 135–147)
T WAVE AXIS: 17 DEGREES
TR MAX PG: 34 MMHG
TR PEAK VELOCITY: 2.9 M/S
TRICUSPID ANNULAR PLANE SYSTOLIC EXCURSION: 3.2 CM
TRICUSPID VALVE PEAK REGURGITATION VELOCITY: 2.89 M/S
VENTRICULAR RATE: 91 BPM

## 2023-06-23 PROCEDURE — 80177 DRUG SCRN QUAN LEVETIRACETAM: CPT

## 2023-06-23 PROCEDURE — 80053 COMPREHEN METABOLIC PANEL: CPT

## 2023-06-23 PROCEDURE — 95816 EEG AWAKE AND DROWSY: CPT

## 2023-06-23 PROCEDURE — 93306 TTE W/DOPPLER COMPLETE: CPT

## 2023-06-23 PROCEDURE — 95819 EEG AWAKE AND ASLEEP: CPT | Performed by: STUDENT IN AN ORGANIZED HEALTH CARE EDUCATION/TRAINING PROGRAM

## 2023-06-23 PROCEDURE — 93306 TTE W/DOPPLER COMPLETE: CPT | Performed by: INTERNAL MEDICINE

## 2023-06-23 PROCEDURE — 70498 CT ANGIOGRAPHY NECK: CPT

## 2023-06-23 PROCEDURE — 99232 SBSQ HOSP IP/OBS MODERATE 35: CPT | Performed by: INTERNAL MEDICINE

## 2023-06-23 PROCEDURE — G1004 CDSM NDSC: HCPCS

## 2023-06-23 PROCEDURE — 99254 IP/OBS CNSLTJ NEW/EST MOD 60: CPT | Performed by: PSYCHIATRY & NEUROLOGY

## 2023-06-23 PROCEDURE — 70496 CT ANGIOGRAPHY HEAD: CPT

## 2023-06-23 PROCEDURE — 93010 ELECTROCARDIOGRAM REPORT: CPT | Performed by: INTERNAL MEDICINE

## 2023-06-23 PROCEDURE — 84146 ASSAY OF PROLACTIN: CPT

## 2023-06-23 RX ORDER — LEVETIRACETAM 500 MG/1
750 TABLET ORAL EVERY 12 HOURS SCHEDULED
Status: DISCONTINUED | OUTPATIENT
Start: 2023-06-23 | End: 2023-06-24 | Stop reason: HOSPADM

## 2023-06-23 RX ADMIN — IOHEXOL 80 ML: 350 INJECTION, SOLUTION INTRAVENOUS at 15:43

## 2023-06-23 RX ADMIN — LEVETIRACETAM 500 MG: 500 TABLET, FILM COATED ORAL at 09:00

## 2023-06-23 RX ADMIN — ENOXAPARIN SODIUM 40 MG: 40 INJECTION SUBCUTANEOUS at 09:00

## 2023-06-23 RX ADMIN — LEVETIRACETAM 750 MG: 500 TABLET, FILM COATED ORAL at 22:12

## 2023-06-23 RX ADMIN — MEMANTINE 10 MG: 10 TABLET ORAL at 09:00

## 2023-06-23 NOTE — ASSESSMENT & PLAN NOTE
New diastolic murmur appreciated at left lower sternal border    Last echo in 2017 was unremarkable    Repeat echo 6/23/23 shows moderate tricuspid regurg, mild RV dilation, mildly increased RV pressure, normal LVEF, grade 1 diastolic dysfunction     Recommend f/u echo every 1 to 2 years
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
## 2023-06-23 NOTE — ASSESSMENT & PLAN NOTE
Patient on [address] outpatient, follows with neurology    Neurology on board, appreciate recommendations  EEG negative for epileptiform activity  Check Keppra level  Check prolactin
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
## 2023-06-23 NOTE — PLAN OF CARE
Problem: PAIN - ADULT  Goal: Verbalizes/displays adequate comfort level or baseline comfort level  Description: Interventions:  - Encourage patient to monitor pain and request assistance  - Assess pain using appropriate pain scale  - Administer analgesics based on type and severity of pain and evaluate response  - Implement non-pharmacological measures as appropriate and evaluate response  - Consider cultural and social influences on pain and pain management  - Notify physician/advanced practitioner if interventions unsuccessful or patient reports new pain  Outcome: Progressing     Problem: INFECTION - ADULT  Goal: Absence or prevention of progression during hospitalization  Description: INTERVENTIONS:  - Assess and monitor for signs and symptoms of infection  - Monitor lab/diagnostic results  - Monitor all insertion sites, i e  indwelling lines, tubes, and drains  - Monitor endotracheal if appropriate and nasal secretions for changes in amount and color  - [place] appropriate cooling/warming therapies per order  - Administer medications as ordered  - Instruct and encourage patient and family to use good hand hygiene technique  - Identify and instruct in appropriate isolation precautions for identified infection/condition  Outcome: Progressing     Problem: SAFETY ADULT  Goal: Patient will remain free of falls  Description: INTERVENTIONS:  - Educate patient/family on patient safety including physical limitations  - Instruct patient to call for assistance with activity   - Consult OT/PT to assist with strengthening/mobility   - Keep Call bell within reach  - Keep bed low and locked with side rails adjusted as appropriate  - Keep care items and personal belongings within reach  - Initiate and maintain comfort rounds  - Make Fall Risk Sign visible to staff  - Offer Toileting every 2 Hours, in advance of need  - Initiate/Maintain alarm  - Obtain necessary fall risk management equipment:   - Apply yellow socks and bracelet for high fall risk patients  - Consider moving patient to room near nurses station  Outcome: Progressing  Goal: Maintain or return to baseline ADL function  Description: INTERVENTIONS:  -  Assess patient's ability to carry out ADLs; assess patient's baseline for ADL function and identify physical deficits which impact ability to perform ADLs (bathing, care of mouth/teeth, toileting, grooming, dressing, etc )  - Assess/evaluate cause of self-care deficits   - Assess range of motion  - Assess patient's mobility; develop plan if impaired  - Assess patient's need for assistive devices and provide as appropriate  - Encourage maximum independence but intervene and supervise when necessary  - Involve family in performance of ADLs  - Assess for home care needs following discharge   - Consider OT consult to assist with ADL evaluation and planning for discharge  - Provide patient education as appropriate  Outcome: Progressing  Goal: Maintains/Returns to pre admission functional level  Description: INTERVENTIONS:  - Perform BMAT or MOVE assessment daily    - Set and communicate daily mobility goal to care team and patient/family/caregiver  - Collaborate with rehabilitation services on mobility goals if consulted  - Perform Range of Motion 2 times a day  - Reposition patient every 2 hours    - Dangle patient 2 times a day  - Stand patient 2 times a day  - Ambulate patient 2 times a day  - Out of bed to chair 2 times a day   - Out of bed for meals 2 times a day  - Out of bed for toileting  - Record patient progress and toleration of activity level   Outcome: Progressing     Problem: DISCHARGE PLANNING  Goal: Discharge to home or other facility with appropriate resources  Description: INTERVENTIONS:  - Identify barriers to discharge w/patient and caregiver  - Arrange for needed discharge resources and transportation as appropriate  - Identify discharge learning needs (meds, wound care, etc )  - Arrange for interpretive services to assist at discharge as needed  - Refer to Case Management Department for coordinating discharge planning if the patient needs post-hospital services based on physician/advanced practitioner order or complex needs related to functional status, cognitive ability, or social support system  Outcome: Progressing     Problem: Knowledge Deficit  Goal: Patient/family/caregiver demonstrates understanding of disease process, treatment plan, medications, and discharge instructions  Description: Complete learning assessment and assess knowledge base    Interventions:  - Provide teaching at level of understanding  - Provide teaching via preferred learning methods  Outcome: Progressing

## 2023-06-23 NOTE — ASSESSMENT & PLAN NOTE
New diastolic murmur appreciated at left lower sternal border    Last echo in 2017 was unremarkable    Repeat echocardiogram
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
## 2023-06-24 VITALS
DIASTOLIC BLOOD PRESSURE: 75 MMHG | WEIGHT: 180 LBS | HEIGHT: 70 IN | RESPIRATION RATE: 17 BRPM | BODY MASS INDEX: 25.77 KG/M2 | SYSTOLIC BLOOD PRESSURE: 122 MMHG | HEART RATE: 82 BPM | OXYGEN SATURATION: 96 % | TEMPERATURE: 98.4 F

## 2023-06-24 PROBLEM — M79.89 PAIN AND SWELLING OF LEFT LOWER LEG: Chronic | Status: ACTIVE | Noted: 2023-06-24

## 2023-06-24 PROBLEM — M79.662 PAIN AND SWELLING OF LEFT LOWER LEG: Chronic | Status: ACTIVE | Noted: 2023-06-24

## 2023-06-24 PROCEDURE — 99239 HOSP IP/OBS DSCHRG MGMT >30: CPT | Performed by: INTERNAL MEDICINE

## 2023-06-24 RX ORDER — LEVETIRACETAM 750 MG/1
750 TABLET ORAL EVERY 12 HOURS SCHEDULED
Qty: 60 TABLET | Refills: 0 | Status: SHIPPED | OUTPATIENT
Start: 2023-06-24 | End: 2023-07-24

## 2023-06-24 RX ADMIN — LEVETIRACETAM 750 MG: 500 TABLET, FILM COATED ORAL at 08:42

## 2023-06-24 RX ADMIN — ENOXAPARIN SODIUM 40 MG: 40 INJECTION SUBCUTANEOUS at 08:42

## 2023-06-24 RX ADMIN — MEMANTINE 10 MG: 10 TABLET ORAL at 08:42

## 2023-06-24 NOTE — PLAN OF CARE
Problem: PAIN - ADULT  Goal: Verbalizes/displays adequate comfort level or baseline comfort level  Description: Interventions:  - Encourage patient to monitor pain and request assistance  - Assess pain using appropriate pain scale  - Administer analgesics based on type and severity of pain and evaluate response  - Implement non-pharmacological measures as appropriate and evaluate response  - Consider cultural and social influences on pain and pain management  - Notify physician/advanced practitioner if interventions unsuccessful or patient reports new pain  Outcome: Adequate for Discharge     Problem: INFECTION - ADULT  Goal: Absence or prevention of progression during hospitalization  Description: INTERVENTIONS:  - Assess and monitor for signs and symptoms of infection  - Monitor lab/diagnostic results  - Monitor all insertion sites, i e  indwelling lines, tubes, and drains  - Monitor endotracheal if appropriate and nasal secretions for changes in amount and color  - [place] appropriate cooling/warming therapies per order  - Administer medications as ordered  - Instruct and encourage patient and family to use good hand hygiene technique  - Identify and instruct in appropriate isolation precautions for identified infection/condition  Outcome: Adequate for Discharge     Problem: SAFETY ADULT  Goal: Patient will remain free of falls  Description: INTERVENTIONS:  - Educate patient/family on patient safety including physical limitations  - Instruct patient to call for assistance with activity   - Consult OT/PT to assist with strengthening/mobility   - Keep Call bell within reach  - Keep bed low and locked with side rails adjusted as appropriate  - Keep care items and personal belongings within reach  - Initiate and maintain comfort rounds  - Make Fall Risk Sign visible to staff  - Apply yellow socks and bracelet for high fall risk patients  - Consider moving patient to room near nurses station  Outcome: Adequate for Discharge     Problem: DISCHARGE PLANNING  Goal: Discharge to home or other facility with appropriate resources  Description: INTERVENTIONS:  - Identify barriers to discharge w/patient and caregiver  - Arrange for needed discharge resources and transportation as appropriate  - Identify discharge learning needs (meds, wound care, etc )  - Arrange for interpretive services to assist at discharge as needed  - Refer to Case Management Department for coordinating discharge planning if the patient needs post-hospital services based on physician/advanced practitioner order or complex needs related to functional status, cognitive ability, or social support system  Outcome: Adequate for Discharge     Problem: Knowledge Deficit  Goal: Patient/family/caregiver demonstrates understanding of disease process, treatment plan, medications, and discharge instructions  Description: Complete learning assessment and assess knowledge base    Interventions:  - Provide teaching at level of understanding  - Provide teaching via preferred learning methods  Outcome: Adequate for Discharge

## 2023-06-24 NOTE — UTILIZATION REVIEW
Continued Stay Review    Date: 6/24/2023                          Current Patient Class: inpatient    Current Level of Care: MED SURG W Tele  OBSERVATION    6/22  @  1709 CHANGED TO IP ADMISSION  6/23 @  1538  Continue work up   "[employer]"    Assessment/Plan:   Provider  Exam  notable for  New 3 out of 6 diastolic murmur appreciated at the left lower sternal border  DDx includes syncope (cardiogenic versus less likely orthostatic or vasovagal) versus seizure  Orthostatic VS normal, EEG negative for epileptiform activity  Echo shows moderate TR  Plan:  Neurology on board, appreciate recommendations  CTA Head and Neck ordered  Check Keppra level  Check prolactin  Monitor on telemetry  If inpatient w/u negative, will likely need o/p cardiac monitoring (zio patch/loop recorder) to r/o parox Afib       Vital Signs:   Date/Time Temp Pulse Resp BP MAP (mmHg) SpO2 O2 Device Patient Position - Orthostatic VS   06/24/23 07:43:18 98 4 °F (36 9 °C) 82 17 122/75 91 96 % -- --   06/23/23 22:44:37 98 °F (36 7 °C) 84 12 122/72 89 96 % -- --   06/23/23 15:24:24 98 4 °F (36 9 °C) 89 12 121/75 90 96 % -- --   06/23/23 08:57:33 -- 86 -- 133/75 94 96 % -- Standing - Orthostatic VS   06/23/23 08:56:28 -- 78 -- 110/76 87 99 % -- Sitting - Orthostatic VS   06/23/23 08:54:55 -- 71 -- 107/69 82 95 % -- Lying - Orthostatic VS   06/23/23 0813 -- 81 -- 149/91 -- -- -- --       Pertinent Labs/Diagnostic Results:    6/23/2023 CTA NECK AND BRAIN WITH AND WITHOUT CONTRAST    No acute intracranial abnormality  Negative CTA head and neck for large vessel occlusion, dissection, aneurysm, or high-grade stenosis  6/22/2023 EEG=  EEG impression: This is an abnormal routine EEG recording due to:  1  Left temporal irregular delta activity  Clinical Interpretation: This abnormal study is consistent with left temporal non-specific encephalopathy  No electrographic seizures or interictal epileptiform discharges (seizure tendency) were seen   No "diagnostic clinical events were captured  Results from last 7 days   Lab Units 06/22/23  1305   WBC Thousand/uL 5 50   HEMOGLOBIN g/dL 12 9   HEMATOCRIT % 40 3   PLATELETS Thousands/uL 219   NEUTROS ABS Thousands/µL 3 71         Results from last 7 days   Lab Units 06/23/23  1301 06/22/23  1305   SODIUM mmol/L 139 139   POTASSIUM mmol/L 3 9 3 7   CHLORIDE mmol/L 103 104   CO2 mmol/L 30 31   ANION GAP mmol/L 6 4   BUN mg/dL 19 21   CREATININE mg/dL 1 09 1 02   EGFR ml/min/1 73sq m 67 73   CALCIUM mg/dL 8 8 9 2     Results from last 7 days   Lab Units 06/23/23  1301 06/22/23  1305   AST U/L 20 27   ALT U/L 20 24   ALK PHOS U/L 99 104   TOTAL PROTEIN g/dL 6 7 7 0   ALBUMIN g/dL 4 0 4 3   TOTAL BILIRUBIN mg/dL 0 37 0 36         Results from last 7 days   Lab Units 06/23/23  1301 06/22/23  1305   GLUCOSE RANDOM mg/dL 138 118             No results found for: \"BETA-HYDROXYBUTYRATE\"                                           Results from last 7 days   Lab Units 06/23/23  1301   PROLACTIN ng/mL 11 59          Medications:   Scheduled Medications:  enoxaparin, 40 mg, Subcutaneous, Daily  levETIRAcetam, 750 mg, Oral, Q12H [place]  memantine, 10 mg, Oral, Daily  Continuous IV Infusions:     PRN Meds:  acetaminophen, 650 mg, Oral, Q6H PRN  aluminum-magnesium hydroxide-simethicone, 30 mL, Oral, Q6H PRN  ondansetron, 4 mg, Intravenous, Q6H PRN  polyethylene glycol, 17 g, Oral, Daily PRN      Discharge Plan: D    Network Utilization Review Department  ATTENTION: Please call with any questions or concerns to [phone] and carefully listen to the prompts so that you are directed to the right person  All voicemails are confidential   Idell Sebastian all requests for admission clinical reviews, approved or denied determinations and any other requests to dedicated fax number below belonging to the campus where the patient is receiving treatment   List of dedicated fax numbers for the Facilities:  [address] [place] [address] " DENIALS (Administrative/Medical Necessity) [phone]   [address] (Maternity/NICU/Pediatrics) [address] L[address] Vivas  [phone]   [address] [address] Medical [place] [place] [address] [address] [facility] [address] [facility] [place] [address] [phone]     '
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
## 2023-06-24 NOTE — PLAN OF CARE
Problem: PAIN - ADULT  Goal: Verbalizes/displays adequate comfort level or baseline comfort level  Description: Interventions:  - Encourage patient to monitor pain and request assistance  - Assess pain using appropriate pain scale  - Administer analgesics based on type and severity of pain and evaluate response  - Implement non-pharmacological measures as appropriate and evaluate response  - Consider cultural and social influences on pain and pain management  - Notify physician/advanced practitioner if interventions unsuccessful or patient reports new pain  Outcome: Progressing     Problem: INFECTION - ADULT  Goal: Absence or prevention of progression during hospitalization  Description: INTERVENTIONS:  - Assess and monitor for signs and symptoms of infection  - Monitor lab/diagnostic results  - Monitor all insertion sites, i e  indwelling lines, tubes, and drains  - Monitor endotracheal if appropriate and nasal secretions for changes in amount and color  - Cord appropriate cooling/warming therapies per order  - Administer medications as ordered  - Instruct and encourage patient and family to use good hand hygiene technique  - Identify and instruct in appropriate isolation precautions for identified infection/condition  Outcome: Progressing     Problem: SAFETY ADULT  Goal: Patient will remain free of falls  Description: INTERVENTIONS:  - Educate patient/family on patient safety including physical limitations  - Instruct patient to call for assistance with activity   - Consult OT/PT to assist with strengthening/mobility   - Keep Call bell within reach  - Keep bed low and locked with side rails adjusted as appropriate  - Keep care items and personal belongings within reach  - Initiate and maintain comfort rounds  - Make Fall Risk Sign visible to staff  - Apply yellow socks and bracelet for high fall risk patients  - Consider moving patient to room near nurses station  Outcome: Progressing  Goal: Maintain or return to baseline ADL function  Description: INTERVENTIONS:  -  Assess patient's ability to carry out ADLs; assess patient's baseline for ADL function and identify physical deficits which impact ability to perform ADLs (bathing, care of mouth/teeth, toileting, grooming, dressing, etc )  - Assess/evaluate cause of self-care deficits   - Assess range of motion  - Assess patient's mobility; develop plan if impaired  - Assess patient's need for assistive devices and provide as appropriate  - Encourage maximum independence but intervene and supervise when necessary  - Involve family in performance of ADLs  - Assess for home care needs following discharge   - Consider OT consult to assist with ADL evaluation and planning for discharge  - Provide patient education as appropriate  Outcome: Progressing  Goal: Maintains/Returns to pre admission functional level  Description: INTERVENTIONS:  - Perform BMAT or MOVE assessment daily    - Set and communicate daily mobility goal to care team and patient/family/caregiver     - Collaborate with rehabilitation services on mobility goals if consulted  - Perform Range of Motion   - Reposition patient   - Dangle patient   - Stand patient   - Ambulate patient   - Out of bed to chair   - Out of bed for meals   - Out of bed for toileting  - Record patient progress and toleration of activity level   Outcome: Progressing     Problem: DISCHARGE PLANNING  Goal: Discharge to home or other facility with appropriate resources  Description: INTERVENTIONS:  - Identify barriers to discharge w/patient and caregiver  - Arrange for needed discharge resources and transportation as appropriate  - Identify discharge learning needs (meds, wound care, etc )  - Arrange for interpretive services to assist at discharge as needed  - Refer to Case Management Department for coordinating discharge planning if the patient needs post-hospital services based on physician/advanced practitioner order or complex needs related to functional status, cognitive ability, or social support system  Outcome: Progressing

## 2023-06-24 NOTE — ASSESSMENT & PLAN NOTE
[facility]  Procalcitonin 11 59  Neurology on board, appreciate recommendations  EEG negative for epileptiform activity  Keppra level still in process which need to be followed up
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
## 2023-06-25 DIAGNOSIS — G31.84 MCI (MILD COGNITIVE IMPAIRMENT): ICD-10-CM

## 2023-06-25 NOTE — ASSESSMENT & PLAN NOTE
Patient reports that he had left leg swelling and pain around the left ankle and the lower third of shin  He had history of femur fracture a couple of years ago  No calf pain and no signs of DVT on physical exam   Most likely due to poor lymphatic circulation s/p femur fracture  Plans:  Recommended to elevate his left leg  Recommended to wear compressive stocking for leg swelling

## 2023-06-26 RX ORDER — MEMANTINE HYDROCHLORIDE 10 MG/1
10 TABLET ORAL DAILY
Qty: 90 TABLET | Refills: 1 | Status: SHIPPED | OUTPATIENT
Start: 2023-06-26

## 2023-06-27 LAB — LEVETIRACETAM SERPL-MCNC: 6.1 UG/ML (ref 10–40)
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
## 2023-06-28 ENCOUNTER — TELEPHONE (OUTPATIENT)
Dept: NEUROLOGY | Facility: CLINIC | Age: 73
End: 2023-06-28
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
## 2023-07-10 ENCOUNTER — OFFICE VISIT (OUTPATIENT)
Dept: NEUROLOGY | Facility: CLINIC | Age: 73
End: 2023-07-10
Payer: COMMERCIAL

## 2023-07-10 VITALS
TEMPERATURE: 97 F | SYSTOLIC BLOOD PRESSURE: 138 MMHG | BODY MASS INDEX: 26.8 KG/M2 | HEIGHT: 70 IN | OXYGEN SATURATION: 95 % | WEIGHT: 187.2 LBS | HEART RATE: 88 BPM | DIASTOLIC BLOOD PRESSURE: 74 MMHG

## 2023-07-10 DIAGNOSIS — R56.9 CONVULSIONS, UNSPECIFIED CONVULSION TYPE (HCC): ICD-10-CM

## 2023-07-10 DIAGNOSIS — R55 SYNCOPE: ICD-10-CM

## 2023-07-10 DIAGNOSIS — G31.84 MCI (MILD COGNITIVE IMPAIRMENT): Primary | ICD-10-CM

## 2023-07-10 DIAGNOSIS — R55 BLACK-OUT (NOT AMNESIA): ICD-10-CM

## 2023-07-10 DIAGNOSIS — Z87.898 HISTORY OF SEIZURE: ICD-10-CM

## 2023-07-10 PROCEDURE — 99215 OFFICE O/P EST HI 40 MIN: CPT | Performed by: PSYCHIATRY & NEUROLOGY
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
## 2023-08-15 ENCOUNTER — TELEPHONE (OUTPATIENT)
Dept: CARDIOLOGY CLINIC | Facility: CLINIC | Age: 73
End: 2023-08-15

## 2023-08-15 ENCOUNTER — CONSULT (OUTPATIENT)
Dept: CARDIOLOGY CLINIC | Facility: CLINIC | Age: 73
End: 2023-08-15
Payer: COMMERCIAL

## 2023-08-15 VITALS
WEIGHT: 184 LBS | HEART RATE: 80 BPM | DIASTOLIC BLOOD PRESSURE: 78 MMHG | BODY MASS INDEX: 26.34 KG/M2 | HEIGHT: 70 IN | SYSTOLIC BLOOD PRESSURE: 138 MMHG | OXYGEN SATURATION: 97 %

## 2023-08-15 DIAGNOSIS — R01.1 CARDIAC MURMUR: ICD-10-CM

## 2023-08-15 DIAGNOSIS — R55 SYNCOPE: ICD-10-CM

## 2023-08-15 DIAGNOSIS — I27.20 PULMONARY HTN (HCC): Primary | ICD-10-CM

## 2023-08-15 DIAGNOSIS — M79.662 PAIN AND SWELLING OF LEFT LOWER LEG: Chronic | ICD-10-CM

## 2023-08-15 DIAGNOSIS — M79.89 PAIN AND SWELLING OF LEFT LOWER LEG: Chronic | ICD-10-CM

## 2023-08-15 DIAGNOSIS — R40.20 LOSS OF CONSCIOUSNESS (HCC): ICD-10-CM

## 2023-08-15 PROCEDURE — 99214 OFFICE O/P EST MOD 30 MIN: CPT | Performed by: INTERNAL MEDICINE
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
## 2023-08-16 ENCOUNTER — TELEPHONE (OUTPATIENT)
Dept: NEUROLOGY | Facility: CLINIC | Age: 73
End: 2023-08-16

## 2023-08-16 NOTE — TELEPHONE ENCOUNTER
Recd  8/15 taken off 8/16   I'm calling about my brother Renee Wise. He has an appointment for an [address] [place] [address] there's complications. Just Give me a call back. My name is jose martin. My brother's name is Sahntal Santoro. His YOB: 1950.     [phone] 05-Aug-2023 11:19 09-Aug-2023 09:02 05-Aug-2023 02:37 05-Aug-2023 11:26

## 2023-08-21 ENCOUNTER — TELEPHONE (OUTPATIENT)
Dept: CARDIOLOGY CLINIC | Facility: CLINIC | Age: 73
End: 2023-08-21
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
## 2023-08-21 NOTE — TELEPHONE ENCOUNTER
lorenzo called to report monitor being delivered to pt but it has not yet been applied. They havent received any data. They tried reaching out to pt to assist him.  They could not get in contact with him

## 2023-08-21 NOTE — TELEPHONE ENCOUNTER
Called [phone], spoke to pt's brother Janet Edwards re: below. States that he already got the answer to his questions. Nothing else is needed from our office at this time.

## 2023-08-23 ENCOUNTER — HOSPITAL ENCOUNTER (OUTPATIENT)
Dept: NON INVASIVE DIAGNOSTICS | Facility: HOSPITAL | Age: 73
Discharge: HOME | End: 2023-08-23
Attending: INTERNAL MEDICINE

## 2023-09-21 ENCOUNTER — TELEPHONE (OUTPATIENT)
Dept: NEUROLOGY | Facility: CLINIC | Age: 73
End: 2023-09-21

## 2023-09-21 DIAGNOSIS — Z87.898 HISTORY OF SEIZURE: ICD-10-CM

## 2023-09-21 RX ORDER — LEVETIRACETAM 750 MG/1
750 TABLET ORAL EVERY 12 HOURS SCHEDULED
Qty: 60 TABLET | Refills: 5 | Status: SHIPPED | OUTPATIENT
Start: 2023-09-21 | End: 2023-10-21

## 2023-09-21 NOTE — TELEPHONE ENCOUNTER
Patient needs refill on     levETIRAcetam (Keppra XR) 500 MG extended-release tablet        [facility]

## 2023-11-27 ENCOUNTER — TELEPHONE (OUTPATIENT)
Dept: NEUROLOGY | Facility: CLINIC | Age: 73
End: 2023-11-27

## 2023-12-11 ENCOUNTER — OFFICE VISIT (OUTPATIENT)
Dept: NEUROLOGY | Facility: CLINIC | Age: 73
End: 2023-12-11
Payer: COMMERCIAL

## 2023-12-11 VITALS — BODY MASS INDEX: 26.4 KG/M2 | HEIGHT: 70 IN

## 2023-12-11 DIAGNOSIS — G31.84 MCI (MILD COGNITIVE IMPAIRMENT): ICD-10-CM

## 2023-12-11 DIAGNOSIS — R55 BLACK-OUT (NOT AMNESIA): ICD-10-CM

## 2023-12-11 DIAGNOSIS — Z87.898 HISTORY OF SEIZURE: Primary | ICD-10-CM

## 2023-12-11 PROCEDURE — 99214 OFFICE O/P EST MOD 30 MIN: CPT | Performed by: PSYCHIATRY & NEUROLOGY

## 2023-12-11 RX ORDER — LEVETIRACETAM 750 MG/1
750 TABLET ORAL EVERY 12 HOURS SCHEDULED
Qty: 180 TABLET | Refills: 3 | Status: SHIPPED | OUTPATIENT
Start: 2023-12-11 | End: 2024-12-05

## 2023-12-11 RX ORDER — MEMANTINE HYDROCHLORIDE 10 MG/1
10 TABLET ORAL DAILY
Qty: 90 TABLET | Refills: 3 | Status: SHIPPED | OUTPATIENT
Start: 2023-12-11
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
## 2023-12-19 ENCOUNTER — OFFICE VISIT (OUTPATIENT)
Facility: CLINIC | Age: 73
End: 2023-12-19
Payer: COMMERCIAL

## 2023-12-19 DIAGNOSIS — G31.84 MCI (MILD COGNITIVE IMPAIRMENT): Primary | ICD-10-CM

## 2023-12-19 DIAGNOSIS — R55 BLACK-OUT (NOT AMNESIA): ICD-10-CM

## 2023-12-19 PROCEDURE — 97166 OT EVAL MOD COMPLEX 45 MIN: CPT | Performed by: OCCUPATIONAL THERAPIST

## 2023-12-19 PROCEDURE — 96125 COGNITIVE TEST BY HC PRO: CPT | Performed by: OCCUPATIONAL THERAPIST
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
## 2024-02-01 ENCOUNTER — TELEPHONE (OUTPATIENT)
Dept: NEUROLOGY | Facility: CLINIC | Age: 74
End: 2024-02-01

## 2024-02-01 NOTE — TELEPHONE ENCOUNTER
Sami  1/31 10:50 AM    This is Navi Bhakta. like to talk to somebody about a letter I got in the mail. Thank you.   [phone]

## 2024-02-02 NOTE — TELEPHONE ENCOUNTER
Please call Lila (friend at [facility]) currently with Pt [phone]  Pt was called for jury duty and does not drive.   Please assist.

## 2024-02-05 NOTE — TELEPHONE ENCOUNTER
Called pt re: below. States that he was called for jury duty on 3/21/24. One a week for 16-20 weeks in [place]. Pt does not drive. Requesting a letter to be excused for jury duty due to his medical issue. Pt states that he does not know jury duty fax number. Can call his friend Lila as she knows all the info needed.     Called [phone] and left a message on the answering machine for a call back    Ok to generate?    Navi Bhakta is under my professional care. Patient has medical diagnosis which prevents him for performing jury duty. Please excuse him from jury duty.  If you have any questions or concerns, please don't hesitate to call.

## 2024-02-07 NOTE — TELEPHONE ENCOUNTER
Called pt.  Asked if we could mail him the jury duty letter.  He was agreeable to having letter mailed.      -please mail jury duty letter to pt

## 2024-04-12 ENCOUNTER — TELEPHONE (OUTPATIENT)
Dept: NEUROLOGY | Facility: CLINIC | Age: 74
End: 2024-04-12

## 2024-04-12 NOTE — TELEPHONE ENCOUNTER
Contacted the patient to reschedule the appointment on 06/11/24, rescheduled to 08/19/24. The patient is asking whether he can resume driving.     Please advise

## 2024-08-05 ENCOUNTER — TELEPHONE (OUTPATIENT)
Dept: NEUROLOGY | Facility: CLINIC | Age: 74
End: 2024-08-05

## 2024-08-19 ENCOUNTER — OFFICE VISIT (OUTPATIENT)
Dept: NEUROLOGY | Facility: CLINIC | Age: 74
End: 2024-08-19
Payer: COMMERCIAL

## 2024-08-19 VITALS
BODY MASS INDEX: 24.91 KG/M2 | WEIGHT: 174 LBS | TEMPERATURE: 97.1 F | SYSTOLIC BLOOD PRESSURE: 122 MMHG | HEART RATE: 81 BPM | OXYGEN SATURATION: 96 % | HEIGHT: 70 IN | DIASTOLIC BLOOD PRESSURE: 80 MMHG

## 2024-08-19 DIAGNOSIS — G31.84 MCI (MILD COGNITIVE IMPAIRMENT): Primary | ICD-10-CM

## 2024-08-19 DIAGNOSIS — G40.909 SEIZURE DISORDER (HCC): ICD-10-CM

## 2024-08-19 PROCEDURE — 99215 OFFICE O/P EST HI 40 MIN: CPT | Performed by: PSYCHIATRY & NEUROLOGY
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
## 2025-02-06 DIAGNOSIS — Z87.898 HISTORY OF SEIZURE: ICD-10-CM

## 2025-02-06 DIAGNOSIS — G31.84 MCI (MILD COGNITIVE IMPAIRMENT): ICD-10-CM

## 2025-02-06 RX ORDER — LEVETIRACETAM 750 MG/1
750 TABLET ORAL EVERY 12 HOURS SCHEDULED
Qty: 180 TABLET | Refills: 3 | Status: SHIPPED | OUTPATIENT
Start: 2025-02-06 | End: 2026-02-01

## 2025-02-06 RX ORDER — MEMANTINE HYDROCHLORIDE 10 MG/1
10 TABLET ORAL DAILY
Qty: 90 TABLET | Refills: 1 | Status: SHIPPED | OUTPATIENT
Start: 2025-02-06

## 2025-02-25 ENCOUNTER — TELEPHONE (OUTPATIENT)
Age: 75
End: 2025-02-25

## 2025-02-25 NOTE — TELEPHONE ENCOUNTER
Patient called requesting refill for levETIRAcetam (KEPPRA) 750 mg tablet . Patient made aware medication was refilled on 02/06 for 180 with 3 refills to [facility] pharmacy. Patient instructed to contact the pharmacy to obtain refills of medication. Patient verbalized understanding.

## 2025-04-07 ENCOUNTER — OFFICE VISIT (OUTPATIENT)
Age: 75
End: 2025-04-07
Payer: COMMERCIAL

## 2025-04-07 VITALS
SYSTOLIC BLOOD PRESSURE: 122 MMHG | BODY MASS INDEX: 26.34 KG/M2 | HEIGHT: 70 IN | OXYGEN SATURATION: 95 % | HEART RATE: 90 BPM | WEIGHT: 184 LBS | DIASTOLIC BLOOD PRESSURE: 76 MMHG

## 2025-04-07 DIAGNOSIS — Z87.898 HISTORY OF SEIZURE: ICD-10-CM

## 2025-04-07 DIAGNOSIS — G40.909 SEIZURE DISORDER (HCC): ICD-10-CM

## 2025-04-07 DIAGNOSIS — R41.3 OTHER AMNESIA: ICD-10-CM

## 2025-04-07 DIAGNOSIS — G31.84 MCI (MILD COGNITIVE IMPAIRMENT): Primary | ICD-10-CM

## 2025-04-07 PROCEDURE — 99215 OFFICE O/P EST HI 40 MIN: CPT | Performed by: PSYCHIATRY & NEUROLOGY
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
## 2025-04-16 ENCOUNTER — TELEPHONE (OUTPATIENT)
Age: 75
End: 2025-04-16

## 2025-04-16 NOTE — TELEPHONE ENCOUNTER
Pt called in asking to speak to Sheri. I warm transferred pt to Law office and connected call with Sheri.

## 2025-04-22 NOTE — TELEPHONE ENCOUNTER
Patient's brother called in to provide [facility]'s fax number for Dr Gerard to send script to.    Fax number [phone]    Please assist

## 2025-04-22 NOTE — TELEPHONE ENCOUNTER
04/22/25    Patient Brother Mr. Ocampo called office today to obtain information on Test that patient needs to have done to get him scheduled.    Related TELE-ENCOUNTER Message from today and Provided Contact Number.    Advised to contact our office with a Fax Number if [facility] [place] are not able to view Order so it can be fax over.    Patient Brother UNDERSTOOD and AGREED.    Also, Advice to have Medical Communication Consent Form Updated.  Patient Brother UNDERSTOOD and AGREED.      Any questions, please contact Patient or Brother Mr. Ocampo.  Thank You.

## 2025-04-22 NOTE — TELEPHONE ENCOUNTER
I called patient LVSHOSHANA that he will need to call [facility] to schedule his PET scan due to his insurance would be Par for [place]. I left phone number [phone] for patient to call and schedule.

## 2025-04-25 NOTE — TELEPHONE ENCOUNTER
Patient brother called back to have referral to be faxed over to [facility] @ [phone]. I faxed over form.